# Patient Record
Sex: FEMALE | Race: WHITE | Employment: UNEMPLOYED | URBAN - METROPOLITAN AREA
[De-identification: names, ages, dates, MRNs, and addresses within clinical notes are randomized per-mention and may not be internally consistent; named-entity substitution may affect disease eponyms.]

---

## 2017-02-27 ENCOUNTER — COMMUNICATION - HEALTHEAST (OUTPATIENT)
Dept: SCHEDULING | Facility: CLINIC | Age: 42
End: 2017-02-27

## 2021-01-27 ENCOUNTER — TELEPHONE (OUTPATIENT)
Dept: PSYCHOLOGY | Facility: CLINIC | Age: 46
End: 2021-01-27

## 2021-01-28 ENCOUNTER — FCC EXTENDED DOCUMENTATION (OUTPATIENT)
Dept: PSYCHOLOGY | Facility: CLINIC | Age: 46
End: 2021-01-28

## 2021-01-28 ENCOUNTER — VIRTUAL VISIT (OUTPATIENT)
Dept: PSYCHOLOGY | Facility: CLINIC | Age: 46
End: 2021-01-28
Payer: COMMERCIAL

## 2021-01-28 DIAGNOSIS — F41.1 GAD (GENERALIZED ANXIETY DISORDER): ICD-10-CM

## 2021-01-28 DIAGNOSIS — F32.1 CURRENT MODERATE EPISODE OF MAJOR DEPRESSIVE DISORDER WITHOUT PRIOR EPISODE (H): Primary | ICD-10-CM

## 2021-01-28 PROCEDURE — 90834 PSYTX W PT 45 MINUTES: CPT | Mod: 95

## 2021-01-28 ASSESSMENT — ANXIETY QUESTIONNAIRES
4. TROUBLE RELAXING: SEVERAL DAYS
1. FEELING NERVOUS, ANXIOUS, OR ON EDGE: SEVERAL DAYS
2. NOT BEING ABLE TO STOP OR CONTROL WORRYING: NEARLY EVERY DAY
IF YOU CHECKED OFF ANY PROBLEMS ON THIS QUESTIONNAIRE, HOW DIFFICULT HAVE THESE PROBLEMS MADE IT FOR YOU TO DO YOUR WORK, TAKE CARE OF THINGS AT HOME, OR GET ALONG WITH OTHER PEOPLE: VERY DIFFICULT
3. WORRYING TOO MUCH ABOUT DIFFERENT THINGS: NEARLY EVERY DAY
5. BEING SO RESTLESS THAT IT IS HARD TO SIT STILL: SEVERAL DAYS
GAD7 TOTAL SCORE: 13
6. BECOMING EASILY ANNOYED OR IRRITABLE: MORE THAN HALF THE DAYS
7. FEELING AFRAID AS IF SOMETHING AWFUL MIGHT HAPPEN: MORE THAN HALF THE DAYS

## 2021-01-28 ASSESSMENT — COLUMBIA-SUICIDE SEVERITY RATING SCALE - C-SSRS
REASONS FOR IDEATION PAST MONTH: COMPLETELY TO END OR STOP THE PAIN (YOU COULDN'T GO ON LIVING WITH THE PAIN OR HOW YOU WERE FEELING)
3. HAVE YOU BEEN THINKING ABOUT HOW YOU MIGHT KILL YOURSELF?: NO
2. HAVE YOU ACTUALLY HAD ANY THOUGHTS OF KILLING YOURSELF?: YES
1. IN THE PAST MONTH, HAVE YOU WISHED YOU WERE DEAD OR WISHED YOU COULD GO TO SLEEP AND NOT WAKE UP?: YES
1. IN THE PAST MONTH, HAVE YOU WISHED YOU WERE DEAD OR WISHED YOU COULD GO TO SLEEP AND NOT WAKE UP?: YES
4. HAVE YOU HAD THESE THOUGHTS AND HAD SOME INTENTION OF ACTING ON THEM?: NO
REASONS FOR IDEATION LIFETIME: COMPLETELY TO END OR STOP THE PAIN (YOU COULDN'T GO ON LIVING WITH THE PAIN OR HOW YOU WERE FEELING)
1. IN THE PAST MONTH, HAVE YOU WISHED YOU WERE DEAD OR WISHED YOU COULD GO TO SLEEP AND NOT WAKE UP?: SAME AS ABOVE
5. HAVE YOU STARTED TO WORK OUT OR WORKED OUT THE DETAILS OF HOW TO KILL YOURSELF? DO YOU INTEND TO CARRY OUT THIS PLAN?: NO
ATTEMPT PAST THREE MONTHS: NO
2. HAVE YOU ACTUALLY HAD ANY THOUGHTS OF KILLING YOURSELF LIFETIME?: YES
5. HAVE YOU STARTED TO WORK OUT OR WORKED OUT THE DETAILS OF HOW TO KILL YOURSELF? DO YOU INTEND TO CARRY OUT THIS PLAN?: NO
ATTEMPT LIFETIME: NO
4. HAVE YOU HAD THESE THOUGHTS AND HAD SOME INTENTION OF ACTING ON THEM?: NO

## 2021-01-28 ASSESSMENT — PATIENT HEALTH QUESTIONNAIRE - PHQ9: SUM OF ALL RESPONSES TO PHQ QUESTIONS 1-9: 18

## 2021-01-28 NOTE — PROGRESS NOTES
"                                           Progress Note    Patient Name: Divya Kasper  Date: 2021         Service Type: Individual      Session Start Time: 10:00 AM  Session End Time: 10:45 AM     Session Length: 45 min    Session #: 1    Attendees: Client attended alone    Service Modality:  Phone Visit:      Provider verified identity through the following two step process.  Patient provided:  Patient  and Patient address    The patient has been notified of the following:      \"We have found that certain health care needs can be provided without the need for a face to face visit.  This service lets us provide the care you need with a phone conversation.       I will have full access to your [place] medical record during this entire phone call.   I will be taking notes for your medical record.      Since this is like an office visit, we will bill your insurance company for this service.       There are potential benefits and risks of telephone visits (e.g. limits to patient confidentiality) that differ from in-person visits.?  Confidentiality still applies for telephone services, and nobody will record the visit.  It is important to be in a quiet, private space that is free of distractions (including cell phone or other devices) during the visit.??      If during the course of the call I believe a telephone visit is not appropriate, you will not be charged for this service\"     Consent has been obtained for this service by care team member: Yes      Treatment Plan Last Reviewed: This was the patient's first session. Treatment plan will be completed after DA is complete.  PHQ-9 / RENETTA-7 : 2021    DATA  Interactive Complexity: No  Crisis: No       Progress Since Last Session (Related to Symptoms / Goals / Homework):   Symptoms: This was the patient's first session. Patient endorses symptoms of     Homework: This was the patient's first session.       Episode of Care Goals: This was the patient's " "first session. Patient reports overarching goal of      Current / Ongoing Stressors and Concerns:   Patient reports that she has been feeling like a \"bad mom\".      Treatment Objective(s) Addressed in This Session:   This was the patient's first session.        Intervention:   Motivational Interviewing: client-centered interview focusing on assessing the stages of change.         ASSESSMENT: Current Emotional / Mental Status (status of significant symptoms):   Risk status (Self / Other harm or suicidal ideation)   Patient denies current fears or concerns for personal safety.   Patient reports the following current or recent suicidal ideation or behaviors: patient reported passive SI. Created a safety plan during session. .   Patient denies current or recent homicidal ideation or behaviors.   Patient denies current or recent self injurious behavior or ideation.   Patient denies other safety concerns.     [place] Suicide Severity Rating Scale (Lifetime/Recent)  [place] Suicide Severity Rating (Lifetime/Recent) 1/28/2021   1. Wish to be Dead (Lifetime) Yes   Wish to be Dead Description (Lifetime) \"I just wish that I would go to sleep and not wake. I feel like my kids would be better off without me\"   1. Wish to be Dead (Recent) Yes   Wish to be Dead Description (Recent) same as above   2. Non-Specific Active Suicidal Thoughts (Lifetime) Yes   2. Non-Specific Active Suicidal Thoughts (Recent) Yes   Non-Specific Active Suicidal Thought Description (Recent) \"I wish I could go to bed and not wake\"   3. Active Suicidal Ideation with any Methods (Not Plan) Without Intent to Act (Lifetime) No   3. Active Suicidal Ideation with any Methods (Not Plan) Without Intent to Act (Recent) No   Active Suicidal Ideation with any Methods (Not Plan) Description (Recent) none   4. Active Suicidal Ideation with Some Intent to Act, Without Specific Plan (Lifetime) No   4. Active Suicidal Ideation with Some Intent to Act, Without Specific " Plan (Recent) No   Active Suicidal Ideation with Some Intent to Act, Without Specific Plan Description (Recent) none   5. Active Suicidal Ideation with Specific Plan and Intent (Lifetime) No   5. Active Suicidal Ideation with Specific Plan and Intent (Recent) No   Active Suicidal Ideation with Specific Plan and Intent Description (Recent) none   Most Severe Ideation Rating (Lifetime) 2   Most Severe Ideation Description (Lifetime) 2   Frequency (Lifetime) 1   Duration (Lifetime) 1   Controllability (Lifetime) 1   Protective Factors  (Lifetime) 1   Reasons for Ideation (Lifetime) 5   Most Severe Ideation Rating (Past Month) 1   Most Severe Ideation Description (Past Month) 1   Frequency (Past Month) 1   Duration (Past Month) 1   Controllability (Past Month) 1   Protective Factors (Past Month) 1   Reasons for Ideation (Past Month) 5   Actual Attempt (Lifetime) No   Actual Attempt (Past 3 Months) No   Has subject engaged in non-suicidal self-injurious behavior? (Lifetime) No   Has subject engaged in non-suicidal self-injurious behavior? (Past 3 Months) No        A safety and risk management plan has been developed including: Patient consented to co-developed safety plan.  Safety and risk management plan was completed.  Patient agreed to use safety plan should any safety concerns arise.  A copy was given to the patient.     Appearance:   Visit was conducted via phone call.    Eye Contact:   Visit was conducted via phone call.    Psychomotor Behavior: Visit was conducted via phone call.    Attitude:   Cooperative    Orientation:   All   Speech    Rate / Production: Normal     Volume:  Normal    Mood:    Anxious  Depressed    Affect:    Subdued    Thought Content:  Rumination  Cognitive Distortions   Thought Form:  Coherent    Insight:    Good      Medication Review:   Did not review with patient. Will review next session.      Medication Compliance:   Did not review with patient. Will review next session.      Changes in  "Health Issues:   Did not review with patient. Will review next session.      Chemical Use Review:   Substance Use: Chemical use reviewed, no active concerns identified      Tobacco Use: Did not review with patient. Will review next session.     Diagnosis:  1. Current moderate episode of major depressive disorder without prior episode (H)    2. RENETTA (generalized anxiety disorder)        Collateral Reports Completed:   Patient was asked to complete an KIAN for her PCP at [facility]    PLAN: (Patient Tasks / Therapist Tasks / Other)  Patient was asked to share her safety plan with her identified supports. She was also asked to complete and KIAN form for her PCP at [facility]. Patient was asked to call behavioral access staff to update her insurance information. We will discuss next session.         CAROLE Owen ABIEL 1/28/2021  Service Performed and Documented by BRADLEY-   Note reviewed and clinical supervision by CAROLE Vargas [facility] 2/1/2021                                                         ______________________________________________________________________                                             Divya L Haycraft     SAFETY PLAN:  Step 1: Warning signs / cues (Thoughts, images, mood, situation, behavior) that a crisis may be developing:    Thoughts: \"I don't matter\", \"I'm a burden\" and \"I can't do this anymore\"    Images: none    Thinking Processes: ruminations (can't stop thinking about my problems): over thinking and racing thoughts    Mood: worsening depression, hopelessness, helplessness, agitation and mood swings    Behaviors: can't stop crying, not taking care of myself and not taking care of my responsibilities    Situations: relationship problems and and the holidays   Step 2: Coping strategies - Things I can do to take my mind off of my problems without contacting another person (relaxation technique, physical activity):    Distress Tolerance Strategies:  play with my pet  and watch " "TV and play on her phone    Physical Activities: go for a walk    Focus on helpful thoughts:  unsure  Step 3: People and social settings that provide distraction:   Name: Matt Phone: number in personal cell phone   Name: Nella Phone: number in personal cell phone   Name: Ashlee (mom) Phone: number in personal cell phone  Step 4: Remind myself of people and things that are important to me and worth living for:  \"my kids, they're usually the only thing that reminds me how important I am.\"  Step 5: When I am in crisis, I can ask these people to help me use my safety plan:   Name: Matt    Name: Nella    Name: Ashlee   Step 6: Making the environment safe:     be around others  Step 7: Professionals or agencies I can contact during a crisis:    [facility] Daytime Number: [phone]    Suicide Prevention Lifeline: 7-204-693-TALK (8255)    Crisis Text Line Service (available 24 hours a day, 7 days a week): Text [place] to 203621    Call 911 or go to my nearest emergency department.   I helped develop this safety plan and agree to use it when needed.  I have been given a copy of this plan.      Client signature _________________________________________________________________  Today s date:  1/28/2021  Adapted from Safety Plan Template 2008 Tanya Barrientos and Pete Gurrola is reprinted with the express permission of the authors.  No portion of the Safety Plan Template may be reproduced without the express, written permission.  You can contact the authors at [email or web address] or [email or web address].            "
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
## 2021-01-29 ASSESSMENT — ANXIETY QUESTIONNAIRES: GAD7 TOTAL SCORE: 13

## 2021-02-16 ENCOUNTER — VIRTUAL VISIT (OUTPATIENT)
Dept: PSYCHOLOGY | Facility: CLINIC | Age: 46
End: 2021-02-16
Payer: COMMERCIAL

## 2021-02-16 DIAGNOSIS — F41.1 GAD (GENERALIZED ANXIETY DISORDER): ICD-10-CM

## 2021-02-16 DIAGNOSIS — F32.1 CURRENT MODERATE EPISODE OF MAJOR DEPRESSIVE DISORDER WITHOUT PRIOR EPISODE (H): Primary | ICD-10-CM

## 2021-02-16 PROCEDURE — 90834 PSYTX W PT 45 MINUTES: CPT | Mod: 95

## 2021-02-16 RX ORDER — VENLAFAXINE 75 MG/1
75 TABLET ORAL 3 TIMES DAILY
COMMUNITY

## 2021-02-16 RX ORDER — LORAZEPAM 0.5 MG/1
0.5 TABLET ORAL EVERY 8 HOURS PRN
COMMUNITY

## 2021-02-16 RX ORDER — METOPROLOL SUCCINATE 50 MG/1
50 TABLET, EXTENDED RELEASE ORAL DAILY
COMMUNITY

## 2021-02-16 NOTE — PROGRESS NOTES
Progress Note    Patient Name: Divya Kasper  Date: 2021         Service Type: Individual      Session Start Time: 1:30 PM  Session End Time: 2:15 PM     Session Length: 45 min    Session #: 2    Attendees: Client attended alone     Service Modality:  Video Visit:      Provider verified identity through the following two step process.  Patient provided:  Patient  and Patient address    Telemedicine Visit: The patient's condition can be safely assessed and treated via synchronous audio and visual telemedicine encounter.      Reason for Telemedicine Visit: Services only offered telehealth    Originating Site (Patient Location): Patient's home    Distant Site (Provider Location): Provider Remote Setting    Consent:  The patient/guardian has verbally consented to: the potential risks and benefits of telemedicine (video visit) versus in person care; bill my insurance or make self-payment for services provided; and responsibility for payment of non-covered services.     Patient would like the video invitation sent by:  Text to cell phone: [phone]    Mode of Communication:  Video Conference via [facility]    As the provider I attest to compliance with applicable laws and regulations related to telemedicine.     Treatment Plan Last Reviewed: This was the patient's first session. Treatment plan will be completed after DA is complete.  PHQ-9 / RENETTA-7 : 2021    DATA  Interactive Complexity: No  Crisis: No       Progress Since Last Session (Related to Symptoms / Goals / Homework):   Symptoms: No change based on patient self-report. Patient endorses symptoms of little interest in doing things, feeling depressed, sleep disturbance, feeling tired, over eating, low self-worth, passive SI, feeling anxious, being unable to control her worry, worrying too much about different things, difficulty relaxing, restlessness, irritability, and feeling afraid something awful might happen.  "    Homework: Achieved / completed to satisfaction      Episode of Care Goals: This was the patient's second session. Provider is still working on completing the DA and has not set treatment plan goals yet.      Current / Ongoing Stressors and Concerns:   Patient reports that she has been feeling like a \"bad mom\". Patient reports that she is financially stressed. She states that she is stress about her job and helping her daughter with school work.      Treatment Objective(s) Addressed in This Session:   This was the patient's second session. No treatment plan goals have been set yet.        Intervention:   Motivational Interviewing: client-centered interview focusing on assessing the stages of change.         ASSESSMENT: Current Emotional / Mental Status (status of significant symptoms):   Risk status (Self / Other harm or suicidal ideation)   Patient denies current fears or concerns for personal safety.   Patient reports the following current or recent suicidal ideation or behaviors: patient reported passive SI. Patient reports that she can keep herself safe at home. .   Patient denies current or recent homicidal ideation or behaviors.   Patient denies current or recent self injurious behavior or ideation.   Patient denies other safety concerns.              Patient reports there has been no change in risk factors since their last session.     Patient reports there has been no change in protective factors since their last session.     A safety and risk management plan has been developed including: Patient consented to co-developed safety plan.  Safety and risk management plan was completed.  Patient agreed to use safety plan should any safety concerns arise.  A copy was given to the patient.                                               Appearance:   Appropriate    Eye Contact:   Good    Psychomotor Behavior: Normal    Attitude:   Cooperative    Orientation:   All   Speech    Rate / Production: Normal " "    Volume:  Normal    Mood:    Anxious  Depressed    Affect:    Subdued    Thought Content:  Rumination  Cognitive Distortions   Thought Form:  Coherent    Insight:    Good      Medication Review:   Updated medication list.      Medication Compliance:   Yes.      Changes in Health Issues:   None reported.      Chemical Use Review:   Substance Use: Chemical use reviewed, no active concerns identified      Tobacco Use: Did not review with patient. Will review next session.     Diagnosis:  1. Current moderate episode of major depressive disorder without prior episode (H)    2. RENETTA (generalized anxiety disorder)        Collateral Reports Completed:   Patient was asked to complete an KIAN for her PCP at [facility]    PLAN: (Patient Tasks / Therapist Tasks / Other)  Patient was asked to complete and KIAN form for her PCP at [facility]. Patient was asked to think of attainable therapeutic goals related to overarching goals identified in session. We will discuss next session.         CAROLE Owen [facility] 2/16/2021  Service Performed and Documented by ABIEL-   Note reviewed and clinical supervision by CAROLE Vargas [facility] 2/17/2021                                                         ______________________________________________________________________                                             Divya L Haycraft     SAFETY PLAN:  Step 1: Warning signs / cues (Thoughts, images, mood, situation, behavior) that a crisis may be developing:    Thoughts: \"I don't matter\", \"I'm a burden\" and \"I can't do this anymore\"    Images: none    Thinking Processes: ruminations (can't stop thinking about my problems): over thinking and racing thoughts    Mood: worsening depression, hopelessness, helplessness, agitation and mood swings    Behaviors: can't stop crying, not taking care of myself and not taking care of my responsibilities    Situations: relationship problems and and the holidays   Step 2: Coping strategies - " "Things I can do to take my mind off of my problems without contacting another person (relaxation technique, physical activity):    Distress Tolerance Strategies:  play with my pet  and watch TV and play on her phone    Physical Activities: go for a walk    Focus on helpful thoughts:  unsure  Step 3: People and social settings that provide distraction:   Name: Matt Phone: number in personal cell phone   Name: Nella Phone: number in personal cell phone   Name: Ashlee (mom) Phone: number in personal cell phone  Step 4: Remind myself of people and things that are important to me and worth living for:  \"my kids, they're usually the only thing that reminds me how important I am.\"  Step 5: When I am in crisis, I can ask these people to help me use my safety plan:   Name: Matt    Name: Nella    Name: Ashlee   Step 6: Making the environment safe:     be around others  Step 7: Professionals or agencies I can contact during a crisis:    [facility] Daytime Number: [phone]    Suicide Prevention Lifeline: 1-200-933-MOOQ (4753)    Crisis Text Line Service (available 24 hours a day, 7 days a week): Text [place] to 099732    Call 911 or go to my nearest emergency department.   I helped develop this safety plan and agree to use it when needed.  I have been given a copy of this plan.      Client signature _________________________________________________________________  Today s date:  1/28/2021  Adapted from Safety Plan Template 2008 Tanya Barrientos and Pete Gurrola is reprinted with the express permission of the authors.  No portion of the Safety Plan Template may be reproduced without the express, written permission.  You can contact the authors at [email or web address] or [email or web address].          "

## 2021-02-23 ENCOUNTER — VIRTUAL VISIT (OUTPATIENT)
Dept: PSYCHOLOGY | Facility: CLINIC | Age: 46
End: 2021-02-23
Payer: COMMERCIAL

## 2021-02-23 DIAGNOSIS — F34.1 PERSISTENT DEPRESSIVE DISORDER: Primary | ICD-10-CM

## 2021-02-23 DIAGNOSIS — F41.1 GAD (GENERALIZED ANXIETY DISORDER): ICD-10-CM

## 2021-02-23 PROCEDURE — 90791 PSYCH DIAGNOSTIC EVALUATION: CPT | Mod: 95
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
## 2021-02-24 ENCOUNTER — FCC EXTENDED DOCUMENTATION (OUTPATIENT)
Dept: PSYCHOLOGY | Facility: CLINIC | Age: 46
End: 2021-02-24

## 2021-02-24 NOTE — PROGRESS NOTES
Patient reported passive SI during DA session. Provider reviewed safety plan and identified supports with patient. Provider reminded patient that she is always able to go to the Emergency Department if she feels that she cannot keep herself safe. Patient contracted for safety.

## 2021-02-26 ENCOUNTER — TELEPHONE (OUTPATIENT)
Dept: BEHAVIORAL HEALTH | Facility: CLINIC | Age: 46
End: 2021-02-26

## 2021-02-26 NOTE — TELEPHONE ENCOUNTER
"Pt was referred to Adult MH programming by her [facility] therapist, Danyell Bowling. Provider's referral states \"[place] [facility] Assessment has been completed by referring provider.\"     Leanna crowley in Referral #[id number]    Sent message to program    "

## 2021-03-02 ENCOUNTER — VIRTUAL VISIT (OUTPATIENT)
Dept: PSYCHOLOGY | Facility: CLINIC | Age: 46
End: 2021-03-02
Payer: COMMERCIAL

## 2021-03-02 DIAGNOSIS — F41.1 GAD (GENERALIZED ANXIETY DISORDER): Primary | ICD-10-CM

## 2021-03-02 DIAGNOSIS — F34.1 PERSISTENT DEPRESSIVE DISORDER: ICD-10-CM

## 2021-03-02 PROCEDURE — 90834 PSYTX W PT 45 MINUTES: CPT | Mod: 95

## 2021-03-02 ASSESSMENT — ANXIETY QUESTIONNAIRES
GAD7 TOTAL SCORE: 17
4. TROUBLE RELAXING: SEVERAL DAYS
IF YOU CHECKED OFF ANY PROBLEMS ON THIS QUESTIONNAIRE, HOW DIFFICULT HAVE THESE PROBLEMS MADE IT FOR YOU TO DO YOUR WORK, TAKE CARE OF THINGS AT HOME, OR GET ALONG WITH OTHER PEOPLE: VERY DIFFICULT
3. WORRYING TOO MUCH ABOUT DIFFERENT THINGS: NEARLY EVERY DAY
7. FEELING AFRAID AS IF SOMETHING AWFUL MIGHT HAPPEN: NEARLY EVERY DAY
5. BEING SO RESTLESS THAT IT IS HARD TO SIT STILL: SEVERAL DAYS
2. NOT BEING ABLE TO STOP OR CONTROL WORRYING: NEARLY EVERY DAY
6. BECOMING EASILY ANNOYED OR IRRITABLE: NEARLY EVERY DAY
1. FEELING NERVOUS, ANXIOUS, OR ON EDGE: NEARLY EVERY DAY

## 2021-03-02 ASSESSMENT — PATIENT HEALTH QUESTIONNAIRE - PHQ9: SUM OF ALL RESPONSES TO PHQ QUESTIONS 1-9: 20

## 2021-03-02 NOTE — PROGRESS NOTES
Progress Note    Patient Name: Divya Kasper  Date: 3/2/2021         Service Type: Individual      Session Start Time: 1:30 PM  Session End Time: 2:15 PM     Session Length: 45 min    Session #: 4    Attendees: Client attended alone    Service Modality:  Video Visit:      Provider verified identity through the following two step process.  Patient provided:  Patient is known previously to provider    Telemedicine Visit: The patient's condition can be safely assessed and treated via synchronous audio and visual telemedicine encounter.      Reason for Telemedicine Visit: Services only offered telehealth    Originating Site (Patient Location): Patient's home    Distant Site (Provider Location): Provider Remote Setting    Consent:  The patient/guardian has verbally consented to: the potential risks and benefits of telemedicine (video visit) versus in person care; bill my insurance or make self-payment for services provided; and responsibility for payment of non-covered services.     Patient would like the video invitation sent by:  Text to cell phone: [phone]    Mode of Communication:  Video Conference via Amwell    As the provider I attest to compliance with applicable laws and regulations related to telemedicine.     Treatment Plan Last Reviewed: 3/2/2021  PHQ-9 / RENETTA-7 : 3/2/2021    DATA  Interactive Complexity: No  Crisis: No       Progress Since Last Session (Related to Symptoms / Goals / Homework):   Symptoms: Worsening based on PHQ-9 and RENETTA-7 scores and patient self-report. Patient endorses symptoms of little interest in doing things, feeling depressed, sleeping too much, feeling tired, poor appetite, low self-worth, difficulty concentrating, feeling anxious, difficulty controlling worry, worrying too much about different things, difficulty relaxing, restlessness, irritability, and feeling afraid something awful might happen.     Homework: Achieved / completed to  "satisfaction      Episode of Care Goals: Satisfactory progress - CONTEMPLATION (Considering change and yet undecided); Intervened by assessing the negative and positive thinking (ambivalence) about behavior change. Patient and provider collaborated to create a treatment plan this session.      Current / Ongoing Stressors and Concerns:   Patient reports that she has been feeling like a \"bad mom\". Patient reports that she is financially stressed. She states that she is stress about helping her daughter with school work. Patient reports that she is concerned about her relationship with her daughter's father.      Treatment Objective(s) Addressed in This Session:   created a treatment plan this session.      Intervention:   Motivational Interviewing: client-centered interview focusing on assessing the stages of change. Discussed IOP and PHP options for programming.         ASSESSMENT: Current Emotional / Mental Status (status of significant symptoms):   Risk status (Self / Other harm or suicidal ideation)   Patient denies current fears or concerns for personal safety.   Patient denies current or recent suicidal ideation or behaviors.   Patient denies current or recent homicidal ideation or behaviors.   Patient denies current or recent self injurious behavior or ideation.   Patient denies other safety concerns.   Patient reports there has been no change in risk factors since their last session.     Patient reports there has been no change in protective factors since their last session.     A safety and risk management plan has been developed including: Patient consented to co-developed safety plan.  Safety and risk management plan was completed.  Patient agreed to use safety plan should any safety concerns arise.  A copy was given to the patient.     Appearance:   Appropriate    Eye Contact:   Good    Psychomotor Behavior: Normal    Attitude:   Cooperative    Orientation:   All   Speech    Rate / Production: Normal " "    Volume:  Normal    Mood:    Anxious  Depressed    Affect:    Subdued    Thought Content:  Rumination  Cognitive Distortions   Thought Form:  Coherent    Insight:    Good      Medication Review:   No changes to current psychiatric medication(s)     Medication Compliance:   Yes     Changes in Health Issues:   None reported     Chemical Use Review:   Substance Use: Chemical use reviewed, no active concerns identified      Tobacco Use: No current tobacco use.      Diagnosis:  1. RENETTA (generalized anxiety disorder)    2. Persistent depressive disorder        Collateral Reports Completed:   Not Applicable    PLAN: (Patient Tasks / Therapist Tasks / Other)  Patient was asked to create a list of coping skills that she is currently using. Patient was asked to call behavioral access to schedule an intake for IOP/PHP. We will discuss next session.     CAROLE Owen [facility] 3/2/2021  Service Performed and Documented by ABIEL-   Note reviewed and clinical supervision by CAROLE Vargas [facility] 3/3/2021                                                       ______________________________________________________________________    Treatment Plan    Patient's Name: Divya Kasper  YOB: 1975    Date: 3/2/2021    DSM5 Diagnoses: 300.4 (F34.1) Persistent Depressive Disorder, Severe or 300.02 (F41.1) Generalized Anxiety Disorder  Psychosocial / Contextual Factors: Spiritual Beliefs: \"I still go to [employer] once in a while but I'm not strict about it. I try to get there especially on holidays\". Individual Factors patient's ex lives across the street from her and she sees him daily, Societal Factors COVID-19 pandemic and Economic Factors patient is currently unemployed.   WHODAS: 19    Referral / Collaboration:  The following referral(s) will be initiated: PHP or IOP.    Anticipated number of session or this episode of care: 12      MeasurableTreatment Goal(s) related to diagnosis / functional impairment(s)  Goal 1: " Client will work on stabilizing depressive symptoms as evidenced by PHQ scores (current is 20) and Self report.  Goal is to reduce by five points.      I will know I've met my goal when I can get up out of bed and be up for more than a half hour at a time.      Objective #A    Client will bring to session stressors since last visit to process and formulate coping mechanisms.  Status: New - Date: 3/2/2021    Intervention(s)  Therapist will bring to each session thought patterns that contribute to stress and depression, and develop cognitive restructuring techniques to help manage these thought distortions.     Objective #B  Client will incorporate self-care in everyday life to manage physical and mental health.   Status: New - Date: 3/2/2021    Intervention(s)  Therapist will process through with success and failures related to self-care activities, including breathing exercises, watching movies, spending time with her daughter.     Objective #C  Client will reduce feeling bad about herself by being more aware of cognitive distortions.  Status: New - Date: 3/2/2021    Intervention(s)  Therapist will teach about cognitive distortions, specifically patterns, and look at ways to be more aware of thoughts.        Goal 2: Client will work on stabilizing anxiety symptoms as evidenced by RENETTA-7 scores (current is 17) and Self report.  Goal is to reduce by five points.      I will know I've met my goal when my heart isn't racing and feeling like it's going to beat out of my chest.      Objective #A Client will identify triggers of anxiety and process them in session.    Status: New - Date: 3/2/2021    Intervention(s)  Therapist will teach emotional recognition/identification by assigning homework to journal with written exercises.    Objective #B  Client will identify initial signs or symptoms of anxiety.    Status: New - Date: 3/2/2021    Intervention(s)  Therapist will teach emotional regulation skills, such as deep  breathing and mindfulness skills.    Objective #C  Client will use thought-stopping strategy daily to reduce intrusive thoughts.  Status: New - Date: 3/2/2021    Intervention(s)  Therapist will provide educational materials on distress tolerance skills and other copings skills.      Goal 3: Client will be monitored for safety concerns (sucidal ideation).     Objective #A  Client will be assessed at each session for suicidal ideation.  Status: New - Date: 3/2/2021     Intervention(s)  Therapist will at each session review SI thoughts, safety plan as needed, and provide appropirate support        Patient has reviewed and agreed to the above plan.      CAROLE Owen  March 2, 2021  Service Performed and Documented by ABIEL-   tx plan reviewed and clinical supervision by CAROLE Vargas [facility] 3/3/2021

## 2021-03-03 ASSESSMENT — ANXIETY QUESTIONNAIRES: GAD7 TOTAL SCORE: 17

## 2021-03-05 ENCOUNTER — TELEPHONE (OUTPATIENT)
Dept: BEHAVIORAL HEALTH | Facility: CLINIC | Age: 46
End: 2021-03-05

## 2021-03-05 NOTE — TELEPHONE ENCOUNTER
This clinician called the patient regarding the referral for Outpatient programs.  Explained that since she completed a DA on 2/23 we can complete an update via phone and recommend a level of care.  The patient stated she is not feeling well today and requested a callback any time on Monday.     CAROLE Leach, [facility]  Mental [facility]  Phone: [phone]

## 2021-03-05 NOTE — TELEPHONE ENCOUNTER
Patient called intake, has not heard back from anyone yet, resent benefits to be checked for march and will send email to Swetha to review, patient is aware she will get call back with directive

## 2021-03-07 ENCOUNTER — HEALTH MAINTENANCE LETTER (OUTPATIENT)
Age: 46
End: 2021-03-07

## 2021-03-08 ENCOUNTER — TELEPHONE (OUTPATIENT)
Dept: BEHAVIORAL HEALTH | Facility: CLINIC | Age: 46
End: 2021-03-08

## 2021-03-08 ASSESSMENT — ANXIETY QUESTIONNAIRES
3. WORRYING TOO MUCH ABOUT DIFFERENT THINGS: NEARLY EVERY DAY
GAD7 TOTAL SCORE: 15
7. FEELING AFRAID AS IF SOMETHING AWFUL MIGHT HAPPEN: NEARLY EVERY DAY
5. BEING SO RESTLESS THAT IT IS HARD TO SIT STILL: NOT AT ALL
6. BECOMING EASILY ANNOYED OR IRRITABLE: NEARLY EVERY DAY
IF YOU CHECKED OFF ANY PROBLEMS ON THIS QUESTIONNAIRE, HOW DIFFICULT HAVE THESE PROBLEMS MADE IT FOR YOU TO DO YOUR WORK, TAKE CARE OF THINGS AT HOME, OR GET ALONG WITH OTHER PEOPLE: VERY DIFFICULT
2. NOT BEING ABLE TO STOP OR CONTROL WORRYING: NEARLY EVERY DAY
1. FEELING NERVOUS, ANXIOUS, OR ON EDGE: NEARLY EVERY DAY

## 2021-03-08 ASSESSMENT — PATIENT HEALTH QUESTIONNAIRE - PHQ9
5. POOR APPETITE OR OVEREATING: NOT AT ALL
SUM OF ALL RESPONSES TO PHQ QUESTIONS 1-9: 23

## 2021-03-08 NOTE — TELEPHONE ENCOUNTER
Writer called pt today to discuss ADT openings and offer a tentative start date.  Left a vm message requesting a return phone call.

## 2021-03-08 NOTE — TELEPHONE ENCOUNTER
spoke with patient on this date.  Reviewed any changes regarding patients status since Diagnostic Assessment on 2/23/21.  PHQ9 and RENETTA 7 updated.  Patient denies current thoughts of self harm, suicidal ideation.  Patient reported that she started individual therapy after initial evaluation and feels that individual therapy is not enough to manage what she is experiencing.   reviewed medications. Patient placed on waitlist for ADT.

## 2021-03-08 NOTE — TELEPHONE ENCOUNTER
LOCUS Worksheet     Name: Divya Kasper MRN: [id number]    : 1975      Gender:  female    PMI:  [id number]   Provider NPI: [id number] Provider Name: REINALDO Torres    Actual level of Care Provided:  Therapy, medications    Service(s) receiving or referred to:  Adult Day Treatment    Reason for Variance: Higher Level of Care      Rating completed by: Kandace Haque [facility]      I. Risk of Harm:   2      Low Risk of Harm    II. Functional Status:   3      Moderate Impairment    III. Co-Morbidity:   1      No Co-Morbidity    IV - A. Recovery Environment - Level of Stress:   2      Mildly Stressful Environment    IV - B. Recovery Environment - Level of Support:   3      Limited Support in Environment    V. Treatment and Recovery History:   3      Moderate to Equivocal Response to Treatment and Recovery Management    VI. Engagement and Recovery Project:   3      Limited Engagement and Recovery       17 Composite Score    Level of Care Recommendation:   17 to 19       High Intensity Community Based Services

## 2021-03-09 ENCOUNTER — TELEPHONE (OUTPATIENT)
Dept: PSYCHOLOGY | Facility: CLINIC | Age: 46
End: 2021-03-09

## 2021-03-09 ASSESSMENT — ANXIETY QUESTIONNAIRES: GAD7 TOTAL SCORE: 15

## 2021-03-09 NOTE — TELEPHONE ENCOUNTER
Provider called patient to see if she was still available for her 3:30 PM session. Provider gave patient the phone number for behavioral access team in case she wanted to reschedule. Provider reminded patient of her next scheduled session on 3/18.

## 2021-03-12 ENCOUNTER — TELEPHONE (OUTPATIENT)
Dept: BEHAVIORAL HEALTH | Facility: CLINIC | Age: 46
End: 2021-03-12

## 2021-03-12 NOTE — TELEPHONE ENCOUNTER
Writer called Pt today and left a message requesting a call back regarding interest and a start date in the program.

## 2021-03-17 ENCOUNTER — TELEPHONE (OUTPATIENT)
Dept: BEHAVIORAL HEALTH | Facility: CLINIC | Age: 46
End: 2021-03-17

## 2021-03-17 NOTE — TELEPHONE ENCOUNTER
Writer called Pt again and left a message to coordinate a start date for the ADT program.  Pt has not answered, nor returned previous phone calls in the past week.  Writer left message requesting a return call.

## 2021-03-18 ENCOUNTER — VIRTUAL VISIT (OUTPATIENT)
Dept: PSYCHOLOGY | Facility: CLINIC | Age: 46
End: 2021-03-18
Payer: COMMERCIAL

## 2021-03-18 DIAGNOSIS — F41.1 GAD (GENERALIZED ANXIETY DISORDER): Primary | ICD-10-CM

## 2021-03-18 DIAGNOSIS — F34.1 PERSISTENT DEPRESSIVE DISORDER: ICD-10-CM

## 2021-03-18 PROCEDURE — 90834 PSYTX W PT 45 MINUTES: CPT | Mod: 95

## 2021-03-18 ASSESSMENT — ANXIETY QUESTIONNAIRES
2. NOT BEING ABLE TO STOP OR CONTROL WORRYING: NEARLY EVERY DAY
3. WORRYING TOO MUCH ABOUT DIFFERENT THINGS: NEARLY EVERY DAY
5. BEING SO RESTLESS THAT IT IS HARD TO SIT STILL: NOT AT ALL
IF YOU CHECKED OFF ANY PROBLEMS ON THIS QUESTIONNAIRE, HOW DIFFICULT HAVE THESE PROBLEMS MADE IT FOR YOU TO DO YOUR WORK, TAKE CARE OF THINGS AT HOME, OR GET ALONG WITH OTHER PEOPLE: VERY DIFFICULT
6. BECOMING EASILY ANNOYED OR IRRITABLE: NEARLY EVERY DAY
7. FEELING AFRAID AS IF SOMETHING AWFUL MIGHT HAPPEN: NEARLY EVERY DAY
1. FEELING NERVOUS, ANXIOUS, OR ON EDGE: NEARLY EVERY DAY
GAD7 TOTAL SCORE: 15
4. TROUBLE RELAXING: NOT AT ALL

## 2021-03-18 ASSESSMENT — PATIENT HEALTH QUESTIONNAIRE - PHQ9: SUM OF ALL RESPONSES TO PHQ QUESTIONS 1-9: 22

## 2021-03-18 NOTE — PROGRESS NOTES
Progress Note    Patient Name: Divya Kasper  Date: 3/18/2021         Service Type: Individual      Session Start Time: 2:30 PM  Session End Time: 3:15 PM     Session Length: 45 min    Session #: 5    Attendees: Client attended alone    Service Modality:  Video Visit:      Provider verified identity through the following two step process.  Patient provided:  Patient is known previously to provider    Telemedicine Visit: The patient's condition can be safely assessed and treated via synchronous audio and visual telemedicine encounter.      Reason for Telemedicine Visit: Services only offered telehealth    Originating Site (Patient Location): Patient's home    Distant Site (Provider Location): Provider Remote Setting    Consent:  The patient/guardian has verbally consented to: the potential risks and benefits of telemedicine (video visit) versus in person care; bill my insurance or make self-payment for services provided; and responsibility for payment of non-covered services.     Patient would like the video invitation sent by:  Text to cell phone: [phone]    Mode of Communication:  Video Conference via Amwell    As the provider I attest to compliance with applicable laws and regulations related to telemedicine.     Treatment Plan Last Reviewed: 3/2/2021  PHQ-9 / RENETTA-7 : 3/18/2021    DATA  Interactive Complexity: No  Crisis: No       Progress Since Last Session (Related to Symptoms / Goals / Homework):   Symptoms: No change based on PHQ-9 and RENETTA-7 scores and patient self-report. Patient endorses symptoms of little interest in doing things, feeling depressed, sleeping too much, feeling tired, poor appetite, low self-worth, difficulty concentrating, psychomotor slowing, feeling anxious, difficulty controlling worry, worrying too much about different things, irritability, and feeling afraid something awful might happen.     Homework: Achieved / completed to  "satisfaction      Episode of Care Goals: Satisfactory progress - CONTEMPLATION (Considering change and yet undecided); Intervened by assessing the negative and positive thinking (ambivalence) about behavior change.       Current / Ongoing Stressors and Concerns:   Patient reports that she has been feeling like a \"bad mom\". Patient reports that she is financially stressed. She states that she is stress about helping her daughter with school work. Patient reports that she is concerned about her relationship with her daughter's father. Patient reports that she is stressed about the custody frausto for her daughter.     Treatment Objective(s) Addressed in This Session:   Decrease frequency and intensity of feeling down, depressed, hopeless     Intervention:   Built rapport with patient. Validated patient's feeling of anxiety and anger concerning the custody frausto over her daughter. Supported patient in thinking of supports that can help when she is feeling anxious or depressed. Continued to discuss IOP and PHP options for programming. Discussed attendance policy with patient and she agreed.         ASSESSMENT: Current Emotional / Mental Status (status of significant symptoms):   Risk status (Self / Other harm or suicidal ideation)   Patient denies current fears or concerns for personal safety.   Patient denies current or recent suicidal ideation or behaviors.   Patient denies current or recent homicidal ideation or behaviors.   Patient denies current or recent self injurious behavior or ideation.   Patient denies other safety concerns.   Patient reports there has been no change in risk factors since their last session.     Patient reports there has been no change in protective factors since their last session.     A safety and risk management plan has been developed including: Patient consented to co-developed safety plan.  Safety and risk management plan was completed.  Patient agreed to use safety plan should any " "safety concerns arise.  A copy was given to the patient.     Appearance:   Appropriate    Eye Contact:   Good    Psychomotor Behavior: Normal    Attitude:   Cooperative    Orientation:   All   Speech    Rate / Production: Normal     Volume:  Normal    Mood:    Anxious  Depressed    Affect:    Subdued    Thought Content:  Rumination  Cognitive Distortions   Thought Form:  Coherent    Insight:    Good      Medication Review:   No changes to current psychiatric medication(s)     Medication Compliance:   Yes     Changes in Health Issues:   None reported     Chemical Use Review:   Substance Use: Chemical use reviewed, no active concerns identified      Tobacco Use: No current tobacco use.      Diagnosis:  1. RENETTA (generalized anxiety disorder)    2. Persistent depressive disorder        Collateral Reports Completed:   made referral to psychiatry    PLAN: (Patient Tasks / Therapist Tasks / Other)  Patient was asked to create a list of coping skills that she is currently using. Patient was asked to call behavioral access to schedule an intake for IOP/PHP. We will discuss next session.       CAROLE Owen [facility] 3/18/2021  Service Performed and Documented by ABIEL-   Note reviewed and clinical supervision by CAROLE Vargas [facility] 3/23/2021    ______________________________________________________________________    Treatment Plan    Patient's Name: Divya Kasper  YOB: 1975    Date: 3/2/2021    DSM5 Diagnoses: 300.4 (F34.1) Persistent Depressive Disorder, Severe or 300.02 (F41.1) Generalized Anxiety Disorder  Psychosocial / Contextual Factors: Spiritual Beliefs: \"I still go to [employer] once in a while but I'm not strict about it. I try to get there especially on holidays\". Individual Factors patient's ex lives across the street from her and she sees him daily, Societal Factors COVID-19 pandemic and Economic Factors patient is currently unemployed.   WHODAS: 19    Referral / Collaboration:  The " following referral(s) will be initiated: PHP or IOP.    Anticipated number of session or this episode of care: 12      MeasurableTreatment Goal(s) related to diagnosis / functional impairment(s)  Goal 1: Client will work on stabilizing depressive symptoms as evidenced by PHQ scores (current is 20) and Self report.  Goal is to reduce by five points.      I will know I've met my goal when I can get up out of bed and be up for more than a half hour at a time.      Objective #A    Client will bring to session stressors since last visit to process and formulate coping mechanisms.  Status: New - Date: 3/2/2021    Intervention(s)  Therapist will bring to each session thought patterns that contribute to stress and depression, and develop cognitive restructuring techniques to help manage these thought distortions.     Objective #B  Client will incorporate self-care in everyday life to manage physical and mental health.   Status: New - Date: 3/2/2021    Intervention(s)  Therapist will process through with success and failures related to self-care activities, including breathing exercises, watching movies, spending time with her daughter.     Objective #C  Client will reduce feeling bad about herself by being more aware of cognitive distortions.  Status: New - Date: 3/2/2021    Intervention(s)  Therapist will teach about cognitive distortions, specifically patterns, and look at ways to be more aware of thoughts.        Goal 2: Client will work on stabilizing anxiety symptoms as evidenced by RENETTA-7 scores (current is 17) and Self report.  Goal is to reduce by five points.      I will know I've met my goal when my heart isn't racing and feeling like it's going to beat out of my chest.      Objective #A Client will identify triggers of anxiety and process them in session.    Status: New - Date: 3/2/2021    Intervention(s)  Therapist will teach emotional recognition/identification by assigning homework to journal with written  exercises.    Objective #B  Client will identify initial signs or symptoms of anxiety.    Status: New - Date: 3/2/2021    Intervention(s)  Therapist will teach emotional regulation skills, such as deep breathing and mindfulness skills.    Objective #C  Client will use thought-stopping strategy daily to reduce intrusive thoughts.  Status: New - Date: 3/2/2021    Intervention(s)  Therapist will provide educational materials on distress tolerance skills and other copings skills.      Goal 3: Client will be monitored for safety concerns (sucidal ideation).     Objective #A  Client will be assessed at each session for suicidal ideation.  Status: New - Date: 3/2/2021     Intervention(s)  Therapist will at each session review SI thoughts, safety plan as needed, and provide appropirate support        Patient has reviewed and agreed to the above plan.      CAROLE Owen  March 2, 2021  Service Performed and Documented by RAVEN villasenor plan reviewed and clinical supervision by CAROLE Vargas [facility] 3/3/2021

## 2021-03-19 ASSESSMENT — ANXIETY QUESTIONNAIRES: GAD7 TOTAL SCORE: 15

## 2021-03-22 ENCOUNTER — VIRTUAL VISIT (OUTPATIENT)
Dept: PSYCHOLOGY | Facility: CLINIC | Age: 46
End: 2021-03-22
Payer: COMMERCIAL

## 2021-03-22 DIAGNOSIS — F34.1 PERSISTENT DEPRESSIVE DISORDER: Primary | ICD-10-CM

## 2021-03-22 DIAGNOSIS — F41.1 GAD (GENERALIZED ANXIETY DISORDER): ICD-10-CM

## 2021-03-22 PROCEDURE — 90834 PSYTX W PT 45 MINUTES: CPT | Mod: 95

## 2021-03-22 NOTE — PROGRESS NOTES
Progress Note    Patient Name: Divya Kasper  Date: 3/22/2021         Service Type: Individual      Session Start Time: 1:30 PM  Session End Time: 2:15 PM     Session Length: 45 min    Session #: 6    Attendees: Client attended alone    Service Modality:  Video Visit:      Provider verified identity through the following two step process.  Patient provided:  Patient is known previously to provider    Telemedicine Visit: The patient's condition can be safely assessed and treated via synchronous audio and visual telemedicine encounter.      Reason for Telemedicine Visit: Services only offered telehealth    Originating Site (Patient Location): Patient's home    Distant Site (Provider Location): Provider Remote Setting    Consent:  The patient/guardian has verbally consented to: the potential risks and benefits of telemedicine (video visit) versus in person care; bill my insurance or make self-payment for services provided; and responsibility for payment of non-covered services.     Patient would like the video invitation sent by:  Text to cell phone: [phone]    Mode of Communication:  Video Conference via [employer]    As the provider I attest to compliance with applicable laws and regulations related to telemedicine.     Treatment Plan Last Reviewed: 3/2/2021  PHQ-9 / RENETTA-7 : 3/18/2021    DATA  Interactive Complexity: No  Crisis: No       Progress Since Last Session (Related to Symptoms / Goals / Homework):   Symptoms: No change based on PHQ-9 and RENETTA-7 scores and patient self-report. Patient endorses symptoms of little interest in doing things, feeling depressed, sleeping too much, feeling tired, poor appetite, low self-worth, difficulty concentrating, psychomotor slowing, feeling anxious, difficulty controlling worry, worrying too much about different things, irritability, and feeling afraid something awful might happen.     Homework: Achieved / completed to  "satisfaction      Episode of Care Goals: Satisfactory progress - CONTEMPLATION (Considering change and yet undecided); Intervened by assessing the negative and positive thinking (ambivalence) about behavior change.       Current / Ongoing Stressors and Concerns:   Patient reports that she has been feeling like a \"bad mom\". Patient reports that she is financially stressed. She states that she is stress about helping her daughter with school work. Patient reports that she is concerned about her relationship with her daughter's father. Patient reports that she is stressed about the custody frausto for her daughter.     Treatment Objective(s) Addressed in This Session:   use thought-stopping strategy daily to reduce intrusive thoughts     Intervention:   Built rapport with patient. Validated patient's feeling of anxiety. Discussed the importance of recognizing warning signs of increasing anxiety so that interventions can be used before anxiety increases. Supported patient in thinking of ways that she can reduce anxiety (go outside, deep breathing). Taught patient the 5-4-3-2-1 grounding exercise. Provided resources for rental assistance programs per patient's request ([place] [address] Emergency Rental Assistance and [facility] Emergency Assistance).      ASSESSMENT: Current Emotional / Mental Status (status of significant symptoms):   Risk status (Self / Other harm or suicidal ideation)   Patient denies current fears or concerns for personal safety.   Patient denies current or recent suicidal ideation or behaviors.   Patient denies current or recent homicidal ideation or behaviors.   Patient denies current or recent self injurious behavior or ideation.   Patient denies other safety concerns.   Patient reports there has been no change in risk factors since their last session.     Patient reports there has been no change in protective factors since their last session.     A safety and risk management plan has been " "developed including: Patient consented to co-developed safety plan.  Safety and risk management plan was completed.  Patient agreed to use safety plan should any safety concerns arise.  A copy was given to the patient.     Appearance:   Appropriate    Eye Contact:   Good    Psychomotor Behavior: Normal    Attitude:   Cooperative    Orientation:   All   Speech    Rate / Production: Normal     Volume:  Normal    Mood:    Anxious  Depressed    Affect:    Subdued    Thought Content:  Rumination  Cognitive Distortions   Thought Form:  Coherent    Insight:    Good      Medication Review:   No changes to current psychiatric medication(s)     Medication Compliance:   Yes     Changes in Health Issues:   None reported     Chemical Use Review:   Substance Use: Chemical use reviewed, no active concerns identified      Tobacco Use: No current tobacco use.      Diagnosis:  1. Persistent depressive disorder    2. RENETTA (generalized anxiety disorder)        Collateral Reports Completed:   Not Applicable    PLAN: (Patient Tasks / Therapist Tasks / Other)  Patient was asked to practice 5-4-3-2-1 grounding skill when she is feeling anxious. Patient was asked to call behavioral access to schedule an intake for IOP/PHP. We will discuss next session.       CAROLE Owen [facility] 3/22/2021  Service Performed and Documented by ABIEL-   Note reviewed and clinical supervision by CAROLE Vargas [facility] 3/24/2021    ______________________________________________________________________    Treatment Plan    Patient's Name: Divya Kasper  YOB: 1975    Date: 3/2/2021    DSM5 Diagnoses: 300.4 (F34.1) Persistent Depressive Disorder, Severe or 300.02 (F41.1) Generalized Anxiety Disorder  Psychosocial / Contextual Factors: Spiritual Beliefs: \"I still go to [employer] once in a while but I'm not strict about it. I try to get there especially on holidays\". Individual Factors patient's ex lives across the street from her and she sees " him daily, Societal Factors COVID-19 pandemic and Economic Factors patient is currently unemployed.   WHODAS: 19    Referral / Collaboration:  The following referral(s) will be initiated: PHP or IOP.    Anticipated number of session or this episode of care: 12      MeasurableTreatment Goal(s) related to diagnosis / functional impairment(s)  Goal 1: Client will work on stabilizing depressive symptoms as evidenced by PHQ scores (current is 20) and Self report.  Goal is to reduce by five points.      I will know I've met my goal when I can get up out of bed and be up for more than a half hour at a time.      Objective #A    Client will bring to session stressors since last visit to process and formulate coping mechanisms.  Status: New - Date: 3/2/2021    Intervention(s)  Therapist will bring to each session thought patterns that contribute to stress and depression, and develop cognitive restructuring techniques to help manage these thought distortions.     Objective #B  Client will incorporate self-care in everyday life to manage physical and mental health.   Status: New - Date: 3/2/2021    Intervention(s)  Therapist will process through with success and failures related to self-care activities, including breathing exercises, watching movies, spending time with her daughter.     Objective #C  Client will reduce feeling bad about herself by being more aware of cognitive distortions.  Status: New - Date: 3/2/2021    Intervention(s)  Therapist will teach about cognitive distortions, specifically patterns, and look at ways to be more aware of thoughts.        Goal 2: Client will work on stabilizing anxiety symptoms as evidenced by RENETTA-7 scores (current is 17) and Self report.  Goal is to reduce by five points.      I will know I've met my goal when my heart isn't racing and feeling like it's going to beat out of my chest.      Objective #A Client will identify triggers of anxiety and process them in session.    Status: New  "- Date: 3/2/2021    Intervention(s)  Therapist will teach emotional recognition/identification by assigning homework to journal with written exercises.    Objective #B  Client will identify initial signs or symptoms of anxiety.    Status: New - Date: 3/2/2021    Intervention(s)  Therapist will teach emotional regulation skills, such as deep breathing and mindfulness skills.    Objective #C  Client will use thought-stopping strategy daily to reduce intrusive thoughts.  Status: New - Date: 3/2/2021    Intervention(s)  Therapist will provide educational materials on distress tolerance skills and other copings skills.      Goal 3: Client will be monitored for safety concerns (sucidal ideation).     Objective #A  Client will be assessed at each session for suicidal ideation.  Status: New - Date: 3/2/2021     Intervention(s)  Therapist will at each session review SI thoughts, safety plan as needed, and provide appropirate support        Patient has reviewed and agreed to the above plan.      CAROLE Owen [facility]  March 2, 2021  Service Performed and Documented by ABIEL-   tx plan reviewed and clinical supervision by CAROLE Vargas [facility] 3/3/2021  ______________________________________________________                                              Divya Kasper                 SAFETY PLAN:  Step 1: Warning signs / cues (Thoughts, images, mood, situation, behavior) that a crisis may be developing:  ? Thoughts: \"I don't matter\", \"I'm a burden\" and \"I can't do this anymore\"  ? Images: none  ? Thinking Processes: ruminations (can't stop thinking about my problems): over thinking and racing thoughts  ? Mood: worsening depression, hopelessness, helplessness, agitation and mood swings  ? Behaviors: can't stop crying, not taking care of myself and not taking care of my responsibilities  ? Situations: relationship problems and and the holidays   Step 2: Coping strategies - Things I can do to take my mind off of my " "problems without contacting another person (relaxation technique, physical activity):  ? Distress Tolerance Strategies:  play with my pet  and watch TV and play on her phone  ? Physical Activities: go for a walk  ? Focus on helpful thoughts:  unsure  Step 3: People and social settings that provide distraction:                 Name: Matt     Phone: number in personal cell phone                 Name: Nella      Phone: number in personal cell phone                 Name: Ashlee (mom)        Phone: number in personal cell phone  Step 4: Remind myself of people and things that are important to me and worth living for:  \"my kids, they're usually the only thing that reminds me how important I am.\"  Step 5: When I am in crisis, I can ask these people to help me use my safety plan:                 Name: Matt                      Name: Nella                       Name: Ashlee         Step 6: Making the environment safe:   ? be around others  Step 7: Professionals or agencies I can contact during a crisis:  ? [facility] Number: [phone]  ? Suicide Prevention Lifeline: 9-200-706-OPFZ (3734)  ? Crisis Text Line Service (available 24 hours a day, 7 days a week): Text [place] to 536644     Call 911 or go to my nearest emergency department.       I helped develop this safety plan and agree to use it when needed.  I have been given a copy of this plan via [employer].       Client signature _________________________________________________________________  Today s date:  1/28/2021  Adapted from Safety Plan Template 2008 Tanya Barrientos and Pete Gurrola is reprinted with the express permission of the authors.  No portion of the Safety Plan Template may be reproduced without the express, written permission.  You can contact the authors at [email or web address] or [email or web address].  "

## 2021-03-30 ENCOUNTER — TELEPHONE (OUTPATIENT)
Dept: BEHAVIORAL HEALTH | Facility: CLINIC | Age: 46
End: 2021-03-30

## 2021-03-30 NOTE — TELEPHONE ENCOUNTER
Writer received a return phone call from shamar apologizing for not returning previous phone calls.  This was in a voicemail message.    Writer called Pt back today and left another vm requesting a call back to coordinate care and a potential start date.

## 2021-04-02 ENCOUNTER — TELEPHONE (OUTPATIENT)
Dept: PSYCHOLOGY | Facility: CLINIC | Age: 46
End: 2021-04-02

## 2021-04-02 NOTE — TELEPHONE ENCOUNTER
Provider called patient to see if she was still available for her 1:30 PM session. Provider gave patient the phone number to the behavioral access line to schedule if needed. Provider reminded patient of her next scheduled session on 4/26.

## 2021-04-06 ENCOUNTER — TELEPHONE (OUTPATIENT)
Dept: BEHAVIORAL HEALTH | Facility: CLINIC | Age: 46
End: 2021-04-06

## 2021-04-06 NOTE — TELEPHONE ENCOUNTER
Writer called Pt today to discuss the option of starting in the ADT program.  She answered and stated she is interested.  She will start tomorrow.  Completed her admission and answered all questions.  Will inform the team of her admission.

## 2021-04-08 ENCOUNTER — TELEPHONE (OUTPATIENT)
Dept: BEHAVIORAL HEALTH | Facility: CLINIC | Age: 46
End: 2021-04-08

## 2021-04-08 NOTE — TELEPHONE ENCOUNTER
Writer called to conduct admission. Left VM requesting call back. Will reattempt.  Kandace Arteaga RN on 4/8/2021 at 3:32 PM

## 2021-04-09 ENCOUNTER — TELEPHONE (OUTPATIENT)
Dept: BEHAVIORAL HEALTH | Facility: CLINIC | Age: 46
End: 2021-04-09

## 2021-04-09 NOTE — TELEPHONE ENCOUNTER
Writer called to conduct admission.  Left VM requesting call back. Will reattempt.  Also sent [employer] message.  Kandace Arteaga RN on 4/9/2021 at 10:04 AM

## 2021-04-09 NOTE — TELEPHONE ENCOUNTER
Writer called to conduct admission. Pt reported experiencing family emergency this AM and requested to start next week.     Writer shared with team and will reach out to pt on Monday morning.     Kandace Arteaga RN on 4/9/2021 at 12:10 PM

## 2021-04-12 ENCOUNTER — TELEPHONE (OUTPATIENT)
Dept: BEHAVIORAL HEALTH | Facility: CLINIC | Age: 46
End: 2021-04-12

## 2021-04-12 NOTE — TELEPHONE ENCOUNTER
Writer called to conduct admission. Left VM requesting call back. Will reattempt.  Kandace Arteaga RN on 4/12/2021 at 11:16 AM

## 2021-04-12 NOTE — TELEPHONE ENCOUNTER
Writer left VM expressing need to reschedule admission call to the 11am hour today rather than the 9am hour, d/t group scheduling. Gave callback #  Kandace Arteaga RN on 4/12/2021 at 8:31 AM

## 2021-04-13 ENCOUNTER — TELEPHONE (OUTPATIENT)
Dept: BEHAVIORAL HEALTH | Facility: CLINIC | Age: 46
End: 2021-04-13

## 2021-04-13 NOTE — TELEPHONE ENCOUNTER
Writer called to conduct admission. Left VM requesting call back. Will reattempt.  Kandace Arteaga RN on 4/13/2021 at 9:40 AM

## 2021-04-13 NOTE — TELEPHONE ENCOUNTER
Writer called to conduct admission. Left VM requesting call back. Will reattempt.  Kandace Arteaga RN on 4/13/2021 at 12:16 PM

## 2021-04-14 ENCOUNTER — TELEPHONE (OUTPATIENT)
Dept: BEHAVIORAL HEALTH | Facility: CLINIC | Age: 46
End: 2021-04-14

## 2021-04-14 NOTE — TELEPHONE ENCOUNTER
Writer called to conduct admission and/or see if pt was still interested in starting the program. Writer indicated that pt's spot in the program would be held through today and to please let us know how pt would like to proceed. Gave program phn #.  Kandace Arteaga RN on 4/14/2021 at 11:27 AM

## 2021-04-26 ENCOUNTER — FCC EXTENDED DOCUMENTATION (OUTPATIENT)
Dept: PSYCHOLOGY | Facility: CLINIC | Age: 46
End: 2021-04-26

## 2021-04-26 ENCOUNTER — TELEPHONE (OUTPATIENT)
Dept: PSYCHOLOGY | Facility: CLINIC | Age: 46
End: 2021-04-26

## 2021-04-26 NOTE — PROGRESS NOTES
"                    Discharge Summary  Multiple Sessions    Client Name: Divya Kasper MRN#: [id number] YOB: 1975      Intake / Discharge Date: 1/28/2021-4/26/2021      DSM5 Diagnoses: (Sustained by DSM5 Criteria Listed Above)  Diagnoses: 300.4 (F34.1) Persistent Depressive Disorder, Severe  300.02 (F41.1) Generalized Anxiety Disorder  Psychosocial & Contextual Factors: Spiritual Beliefs: \"I still go to [employer] once in a while but I'm not strict about it. I try to get there especially on holidays\". Individual Factors patient's ex lives across the street from her and she sees him daily, Societal Factors COVID-19 pandemic and Economic Factors patient is currently unemployed.   WHODAS 2.0 (12 item) Score: 19          Presenting Concern:  Symptoms of depression and anxiety.       Reason for Discharge:  Noncompliance with attendance agreement.       Disposition at Time of Last Encounter:   Comments: friendly and engaged.      Risk Management:   Client has had a history of suicidal ideation: patient reported history of passive SI during [place].   A safety and risk management plan has been developed including: Patient consented to co-developed safety plan.  Safety and risk management plan was completed.  Patient agreed to use safety plan should any safety concerns arise.  A copy was given to the patient.      Referred To:  None.         CAROLE Owen [facility]   4/26/2021    "

## 2021-04-26 NOTE — LETTER
[facility]  [address] [place] [address]  Tel [phone]  Fax [phone]       4/26/2021      Divya FRANCES Ranjith  [address]  [place] [address]     At the start of therapy with [facility] we ask clients to prioritize their mental health care with an attendance agreement.  In this agreement clients make a commitment to follow through with scheduled appointments. If a person cancels an appointment within 24 hours of the appointment time or does not make their appointment and does not contact the clinic it is considered a violation of the agreement.    It has come to the attention of [facility] administration that you were unable to honor that agreement. Regretfully, we have to inform you that we are discharging you from [facility] due to multiple missed appointments. Any future scheduled appointments have been cancelled.   If you wish to continue therapy with [facility] and are ready to make a commitment to regular attendance, you may call back in six months and we will be happy to review a new agreement with you.     To access current services we recommend that you contact your insurance plan s provider network to identify a therapist with whom you can schedule services. For your convenience we have also listed contact information for two agencies which provide mental health services around the [facility] area.      [facility]  [place] [facility]  [place] [phone] [place] [phone]  [place] [phone] [place] [phone]  [place] [phone] [place] [phone]  [place] [phone] [place] [phone]  [place] [phone]     Sincerely,      [facility]  Administration  [phone]

## 2021-04-26 NOTE — TELEPHONE ENCOUNTER
Provider called patient to see if she was still available for her 2:30 PM session today. Provider reminded patient of the attendance agreement and informed patient that this is the second session that she has not shown up for. Provider informed patient that she will be discharged from provider's case load due to non adherence to the attendance agreement. Provider has canceled all future appointments for patient.

## 2021-05-24 ENCOUNTER — RECORDS - HEALTHEAST (OUTPATIENT)
Dept: ADMINISTRATIVE | Facility: CLINIC | Age: 46
End: 2021-05-24

## 2021-10-11 ENCOUNTER — HEALTH MAINTENANCE LETTER (OUTPATIENT)
Age: 46
End: 2021-10-11

## 2022-01-27 ENCOUNTER — OFFICE VISIT (OUTPATIENT)
Dept: FAMILY MEDICINE | Facility: CLINIC | Age: 47
End: 2022-01-27
Payer: COMMERCIAL

## 2022-01-27 VITALS
OXYGEN SATURATION: 98 % | TEMPERATURE: 97.9 F | SYSTOLIC BLOOD PRESSURE: 114 MMHG | DIASTOLIC BLOOD PRESSURE: 80 MMHG | WEIGHT: 159 LBS | HEART RATE: 95 BPM

## 2022-01-27 DIAGNOSIS — J01.90 ACUTE SINUSITIS, RECURRENCE NOT SPECIFIED, UNSPECIFIED LOCATION: Primary | ICD-10-CM

## 2022-01-27 DIAGNOSIS — J34.89 SINUS DRAINAGE: ICD-10-CM

## 2022-01-27 DIAGNOSIS — J34.89 SINUS PRESSURE: ICD-10-CM

## 2022-01-27 DIAGNOSIS — R05.9 COUGH: ICD-10-CM

## 2022-01-27 PROCEDURE — 99203 OFFICE O/P NEW LOW 30 MIN: CPT | Performed by: FAMILY MEDICINE

## 2022-01-27 NOTE — PATIENT INSTRUCTIONS
Patient Education     Understanding Acute Rhinosinusitis  Acute rhinosinusitis is when the lining of the inside of the nose and the sinuses becomes irritated and swollen. It is also called sinusitis, or a sinus infection.  Sinuses are air-filled spaces in the skull behind the face. They are kept moist and clean by a lining of mucosa. Things such as pollen, smoke, and chemical fumes can irritate the mucosa. It can then swell up. As a response to irritation, the mucosa makes more mucus and other fluids. Tiny hairlike cilia cover the mucosa. Cilia help carry mucus toward the opening of the sinus. Too much mucus may cause the cilia to stop working. This blocks the sinus opening. A buildup of fluid in the sinuses then causes pain and pressure. It can also cause bacteria to grow in the sinuses.    What causes acute rhinosinusitis?  A sinus infection is most often caused by a virus. You are more likely to get one after having a cold or the flu. In some cases, a sinus infection can be caused by bacteria.  You are at higher risk for a sinus infection if you:    Are older in age    Have structural problems with your sinuses    Smoke or are exposed to secondhand smoke    Are exposed to changes in pressure, such as from flying a lot or deep sea diving    Have asthma or allergies    Have a weak immune system    Have dental disease     Symptoms of acute rhinosinusitis  Symptoms of acute rhinosinusitis often last around 7 to 10 days. If you have a bacterial infection, they may last longer. They may also get better but then worsen. You may have:    Face pain or pressure under the eyes and around the nose    Headache    Fluid draining in the back of the throat (postnasal drip)    Congestion    Drainage that is thick and colored (often green), instead of clear    Cough    Problems with your sense of smell    Ear pain or hearing problems    Fever    Tooth pain    Fatigue  Diagnosing acute rhinosinusitis  Your healthcare provider will  ask about your symptoms and past health. He or she will look at your ears, nose, throat, and sinuses. Imaging tests, such as X-rays, are often not needed.  It can be hard to figure out if a sinus infection is caused by a virus or bacterium. A bacterial infection tends to last longer. Symptoms may also get better but then worsen. Your healthcare provider may take a sample of mucus from your nose to check for bacteria.  Treating acute rhinosinusitis  Most sinus infections will go away within 10 days. Your body will fight off the virus. If your symptoms seem to get better but then worsen, you may have a bacterial infection instead. Your healthcare provider will then give you antibiotics. Take this medicine until it is gone, even if you feel better.  To help ease your symptoms, your healthcare provider may advise:    Over-the-counter pain relievers. Medicines such as acetaminophen or ibuprofen can ease sinus pain. They may also lower a fever.    Nasal washes. Washing your nasal passages with salt water may ease pain and pressure. It can rinse out mucous and other irritants from your sinuses. Your healthcare provider can show you how to do it.    Nasal steroid spray. This prescription medicine can reduce inflammation in your sinuses.    Other medicines. Decongestants, antihistamines, and other nasal sprays may give short-term relief. They may help with congestion. Talk with your healthcare provider before taking these medicines.     Preventing acute rhinosinusitis  You can help prevent a sinus infection with these steps:    Wash your hands well and often.    Stay away from people who have a cold or upper respiratory infection.    Don't smoke. And stay away from secondhand smoke.    Use a humidifier at home.    Make sure you are up-to-date on your vaccines, such as the flu shot.     When to call your healthcare provider  Call your healthcare provider right away if you have any of these:    Fever of 100.4 F (38 C) or  higher, or as directed by your healthcare provider    Pain that gets worse    Symptoms that don t get better, or get worse    New symptoms  [employer] last reviewed this educational content on 6/1/2019 2000-2021 [employer]. All rights reserved. This information is not intended as a substitute for professional medical care. Always follow your healthcare professional's instructions.

## 2022-01-27 NOTE — PROGRESS NOTES
Assessment & Plan     (J01.90) Acute sinusitis, recurrence not specified, unspecified location  (primary encounter diagnosis)  Comment: 46 year old female has sinus pressure and drainage for 11 days and getting worse. Has covid positive at 1/11/2022  Plan: will take abx. Side effect addressed. Advise rest and push fluid. If symptoms worse she will follow-up and call.     (J34.89) Sinus drainage  Comment: thick drainage and symptoms of sinusitis   Plan: take abx    (R05.9) Cough  Comment: is improving   Plan: otc cough mediation     (J34.89) Sinus pressure  Comment: moderate sinus pressure  Plan: motrin prn     advise to get covid vaccine.     Return if symptoms worsen or fail to improve.    Oxana Munoz MD  [facility] SHONNA Stokes is a 46 year old who presents for the following health issues     HPI       Pt has sinuses pressure and drainage for 11 days and getting worse. Mild cough and cough is better. She has left ear pain for several days.She has covid  At 1/11/2022. No fever, wheezing and sob. No covid vaccine.  No headache, cp, vision change    Has not tried otc medication yet.     Review of Systems   Constitutional, HEENT, cardiovascular, pulmonary, gi and gu systems are negative, except as otherwise noted.      Objective    /80   Pulse 95   Temp 97.9  F (36.6  C)   Wt 72.1 kg (159 lb)   SpO2 98%   Breastfeeding No   There is no height or weight on file to calculate BMI.  Physical Exam   GENERAL: healthy, alert and no distress  HENT: ear canals and TM's normal, nose and mouth without ulcers or lesions. throat normal . Sinus moderate tenderness.   NECK: no adenopathy, no asymmetry, masses, or scars and thyroid normal to palpation  RESP: lungs clear to auscultation - no rales, rhonchi or wheezes  CV: regular rate and rhythm, normal S1 S2, no S3 or S4, no murmur, click or rub, no peripheral edema and peripheral pulses strong

## 2022-03-27 ENCOUNTER — HEALTH MAINTENANCE LETTER (OUTPATIENT)
Age: 47
End: 2022-03-27

## 2022-03-30 ENCOUNTER — HOSPITAL ENCOUNTER (EMERGENCY)
Facility: CLINIC | Age: 47
Discharge: HOME | End: 2022-03-30
Attending: EMERGENCY MEDICINE | Admitting: EMERGENCY MEDICINE
Payer: COMMERCIAL

## 2022-03-30 VITALS
DIASTOLIC BLOOD PRESSURE: 90 MMHG | OXYGEN SATURATION: 100 % | SYSTOLIC BLOOD PRESSURE: 135 MMHG | WEIGHT: 158 LBS | BODY MASS INDEX: 26.98 KG/M2 | HEART RATE: 102 BPM | TEMPERATURE: 98.3 F | RESPIRATION RATE: 18 BRPM | HEIGHT: 64 IN

## 2022-03-30 DIAGNOSIS — R79.89 ELEVATED LFTS: ICD-10-CM

## 2022-03-30 LAB
ALBUMIN SERPL-MCNC: 4.1 G/DL (ref 3.5–5)
ALP SERPL-CCNC: 153 U/L (ref 45–120)
ALT SERPL W P-5'-P-CCNC: 862 U/L (ref 0–45)
ANION GAP SERPL CALCULATED.3IONS-SCNC: 13 MMOL/L (ref 5–18)
AST SERPL W P-5'-P-CCNC: 618 U/L (ref 0–40)
BASOPHILS # BLD AUTO: 0 10E3/UL (ref 0–0.2)
BASOPHILS NFR BLD AUTO: 1 %
BILIRUB DIRECT SERPL-MCNC: 0.4 MG/DL
BILIRUB SERPL-MCNC: 1.3 MG/DL (ref 0–1)
BUN SERPL-MCNC: 9 MG/DL (ref 8–22)
CALCIUM SERPL-MCNC: 9.2 MG/DL (ref 8.5–10.5)
CHLORIDE BLD-SCNC: 104 MMOL/L (ref 98–107)
CO2 SERPL-SCNC: 22 MMOL/L (ref 22–31)
CREAT SERPL-MCNC: 0.92 MG/DL (ref 0.6–1.1)
EOSINOPHIL # BLD AUTO: 0.1 10E3/UL (ref 0–0.7)
EOSINOPHIL NFR BLD AUTO: 3 %
ERYTHROCYTE [DISTWIDTH] IN BLOOD BY AUTOMATED COUNT: 12.9 % (ref 10–15)
GFR SERPL CREATININE-BSD FRML MDRD: 78 ML/MIN/1.73M2
GLUCOSE BLD-MCNC: 106 MG/DL (ref 70–125)
HCT VFR BLD AUTO: 40.3 % (ref 35–47)
HGB BLD-MCNC: 13.7 G/DL (ref 11.7–15.7)
IMM GRANULOCYTES # BLD: 0 10E3/UL
IMM GRANULOCYTES NFR BLD: 0 %
INR PPP: 1.03 (ref 0.85–1.15)
LIPASE SERPL-CCNC: 93 U/L (ref 0–52)
LYMPHOCYTES # BLD AUTO: 1.4 10E3/UL (ref 0.8–5.3)
LYMPHOCYTES NFR BLD AUTO: 31 %
MCH RBC QN AUTO: 30.2 PG (ref 26.5–33)
MCHC RBC AUTO-ENTMCNC: 34 G/DL (ref 31.5–36.5)
MCV RBC AUTO: 89 FL (ref 78–100)
MONOCYTES # BLD AUTO: 0.4 10E3/UL (ref 0–1.3)
MONOCYTES NFR BLD AUTO: 9 %
NEUTROPHILS # BLD AUTO: 2.4 10E3/UL (ref 1.6–8.3)
NEUTROPHILS NFR BLD AUTO: 56 %
NRBC # BLD AUTO: 0 10E3/UL
NRBC BLD AUTO-RTO: 0 /100
PLATELET # BLD AUTO: 252 10E3/UL (ref 150–450)
POTASSIUM BLD-SCNC: 3.8 MMOL/L (ref 3.5–5)
PROT SERPL-MCNC: 7.8 G/DL (ref 6–8)
RBC # BLD AUTO: 4.54 10E6/UL (ref 3.8–5.2)
SODIUM SERPL-SCNC: 139 MMOL/L (ref 136–145)
WBC # BLD AUTO: 4.3 10E3/UL (ref 4–11)

## 2022-03-30 PROCEDURE — 36415 COLL VENOUS BLD VENIPUNCTURE: CPT | Performed by: EMERGENCY MEDICINE

## 2022-03-30 PROCEDURE — 83690 ASSAY OF LIPASE: CPT | Performed by: EMERGENCY MEDICINE

## 2022-03-30 PROCEDURE — 82248 BILIRUBIN DIRECT: CPT | Performed by: EMERGENCY MEDICINE

## 2022-03-30 PROCEDURE — 80074 ACUTE HEPATITIS PANEL: CPT | Performed by: EMERGENCY MEDICINE

## 2022-03-30 PROCEDURE — 85025 COMPLETE CBC W/AUTO DIFF WBC: CPT | Performed by: EMERGENCY MEDICINE

## 2022-03-30 PROCEDURE — 80053 COMPREHEN METABOLIC PANEL: CPT | Performed by: EMERGENCY MEDICINE

## 2022-03-30 PROCEDURE — 85610 PROTHROMBIN TIME: CPT | Performed by: EMERGENCY MEDICINE

## 2022-03-30 PROCEDURE — 99283 EMERGENCY DEPT VISIT LOW MDM: CPT

## 2022-03-30 ASSESSMENT — ENCOUNTER SYMPTOMS
ABDOMINAL PAIN: 0
FEVER: 0
HEMATURIA: 1
DYSURIA: 1
CHILLS: 0
DIARRHEA: 0
COUGH: 0
VOMITING: 0
FREQUENCY: 1
DIAPHORESIS: 0
NAUSEA: 0

## 2022-03-31 NOTE — ED TRIAGE NOTES
Pt states she was seen for abdominal pain x2 days ago. Blood work showed elevated liver enzymes. Back today for US and blood work which showed double in liver enzymes. Urgent care doctor told pt to come to ER for imaging. Denies any abdominal pain at this time. Gallstones present per pt.

## 2022-03-31 NOTE — ED PROVIDER NOTES
EMERGENCY DEPARTMENT ENCOUNTER      NAME: Divya Kasper  AGE: 46 year old female  YOB: 1975  MRN: [id number]  EVALUATION DATE & TIME: 3/30/2022  8:41 PM    PCP: No primary care provider on file.    ED PROVIDER: Jen Khan MD      Chief Complaint   Patient presents with     Abnormal Labs         FINAL IMPRESSION:  1. Elevated LFTs          ED COURSE & MEDICAL DECISION MAKING:    Pertinent Labs & Imaging studies reviewed. (See chart for details)  46 year old female presents to the Emergency Department for evaluation of elevated LFT's.  Yesterday afternoon the patient had an epic episode of epigastric pain radiating into the back for lasted less than an hour.  She was seen in urgent care and had elevated LFTs.  She was then reseen today for a right upper quadrant ultrasound and had progressive elevation of her LFTs.  She was called this evening and recommended she come to the emergency department.  Currently, her LFTs appear to be downtrending.  She is not having any abdominal pain and has had no acute exacerbations of pain.  She currently is not in any pain.  She has not had any constitutional symptoms.  On exam, no reproducible abdominal tenderness.  I spoke with GI.  They would recommend she follow-up for repeat check of her LFTs and would not recommend any further work-up in the emergency department if she is not having any active symptoms.    8:48 PM I met with the patient, obtained history, performed an initial exam, and discussed options and plan for diagnostics and treatment here in the ED. PPE worn including surgical mask, surgical gloves.  9:45 PM I rechecked and updated the patient on results.  9:52 PM I spoke with Dr. Acharya from [facility].  10:06 PM I updated the patient with my discussion with GI and recommend follow-up on Friday as scheduled for repeat check of LFT's.     At the conclusion of the encounter I discussed the results of all of the tests and the disposition. The  questions were answered. The patient or family acknowledged understanding and was agreeable with the care plan.       MEDICATIONS GIVEN IN THE EMERGENCY:  Medications - No data to display    NEW PRESCRIPTIONS STARTED AT TODAY'S ER VISIT  New Prescriptions    No medications on file          =================================================================    HPI    Patient information was obtained from: Patient    Use of : N/A        Divya Kasper is a 46 year old female with a pertinent history of GERD, hypertension who presents to this ED by walk in with her mother for evaluation of abnormal labs. Patient reports she developed epigastric abdominal pain yesterday at around 11:30 AM which radiated into her back. Patient took ibuprofen and some of her GERD medications. Patient then reported to the Urgency Room for evaluation, but states her abdominal pain was resolved by the time she arrived. Pain lasted for less than 1 hour and it has not recurred since then. Patient did have LFTs drawn at that time where were elevated. Today, she reported to the Urgency Room again and had an ultrasound of her gallbladder which found non-obstructing gallstones, but reports she was told she would not need an emergent surgery, but would have to have a cholecystectomy later on. Repeat LFTs were drawn during her UR visit earlier today at approximately 12 PM. She states she was called by the UR earlier today at 7 PM and told her LFT had doubled from their original values and they recommended she report to the ED for further evaluation.    Of note, patient reports she was diagnosed with a UTI during her first UR visit and was prescribed keflex. Patient endorses dysuria, frequency, and urgency. Patient also reports she had hematuria earlier tonight prior to ED arrival. Patient endorses a history of UTIs in the past, but states she has not had hematuria with her UTIs previously.    She states she is on metoprolol for  palpitations, an antihypertensive medication, and two medications for anxiety. No recent changes to her medications. No nausea, vomiting, fever, chills, sweats, cough, congestion, diarrhea, or other recent illness. No surgical history.    SHx- Denies tobacco, alcohol use.    Per chart review, patient was seen at [facility] on 03/29/22 (yesterday). Patient presented with epigastric pain radiating to the back. Associated nausea and vomiting (x1), but attributed to anxiety. Liver panel remarkable for an ALT of 552, AST 1131. Bilirubin direct mildly elevated at 0.7. Tbili just shy of abnormal at 1.2, but still WNL. UA with signs of infection, a positive nitrite, but the patient did not report dysuria, flank pain, or hematuria at that time. Patient was discharged on a 7 day course of keflex.  Patient was seen at the same UR and had an abdominal ultrasound which found Multiple gallstones in an otherwise normal gallbladder. No bile duct dilatation. Repeat liver panel remarkable for an alk phos of 159, ALT 1056, AST 1034, Tbili 1.8, bilirubin direct 1.1.    REVIEW OF SYSTEMS   Review of Systems   Constitutional: Negative for chills, diaphoresis and fever.   HENT: Negative for congestion.    Respiratory: Negative for cough.    Gastrointestinal: Negative for abdominal pain (resolved, epigastric), diarrhea, nausea and vomiting.   Genitourinary: Positive for dysuria, frequency, hematuria and urgency.   All other systems reviewed and are negative.      PAST MEDICAL HISTORY:  History reviewed. No pertinent past medical history.    PAST SURGICAL HISTORY:  History reviewed. No pertinent surgical history.        CURRENT MEDICATIONS:    LORazepam (ATIVAN) 0.5 MG tablet  metoprolol succinate ER (TOPROL-XL) 50 MG 24 hr tablet  venlafaxine (EFFEXOR) 75 MG tablet        ALLERGIES:  Allergies   Allergen Reactions     Sulfa Drugs Diarrhea and Dizziness       FAMILY HISTORY:  History reviewed. No pertinent family  "history.    SOCIAL HISTORY:   Social History     Socioeconomic History     Marital status: Single     Spouse name: Not on file     Number of children: Not on file     Years of education: Not on file     Highest education level: Not on file   Occupational History     Not on file   Tobacco Use     Smoking status: Never Smoker     Smokeless tobacco: Never Used   Substance and Sexual Activity     Alcohol use: Not on file     Drug use: Not on file     Sexual activity: Not on file   Other Topics Concern     Not on file   Social History Narrative     Not on file     Social Determinants of Health     Financial Resource Strain: Not on file   Food Insecurity: Not on file   Transportation Needs: Not on file   Physical Activity: Not on file   Stress: Not on file   Social Connections: Not on file   Intimate Partner Violence: Not on file   Housing Stability: Not on file       VITALS:  BP (!) 158/104   Pulse (!) 125   Temp 98.3  F (36.8  C) (Oral)   Resp 18   Ht 1.626 m (5' 4\")   Wt 71.7 kg (158 lb)   SpO2 98%   BMI 27.12 kg/m      PHYSICAL EXAM    Constitutional: Well developed, Well nourished, NAD  HENT: Normocephalic, Atraumatic, Bilateral external ears normal, Oropharynx normal, mucous membranes moist, Nose normal.   Neck- Normal range of motion, No tenderness, Supple, No stridor.  Eyes: PERRL, EOMI, Conjunctiva normal, No discharge.   Respiratory: Normal breath sounds, No respiratory distress  Cardiovascular: Normal heart rate, Regular rhythm  GI: Bowel sounds normal, Soft, No tenderness,   Musculoskeletal: No edema. Good range of motion in all major joints. No tenderness to palpation or major deformities noted.   Integument: Warm, Dry, No erythema, No rash  Neurologic: Alert & oriented x 3, Normal motor function, Normal sensory function, No focal deficits noted. Normal gait.   Psychiatric: Affect normal, Judgment normal, Mood normal.      LAB:  All pertinent labs reviewed and interpreted.  Results for orders placed or " performed during the hospital encounter of 03/30/22   Result Value Ref Range    INR 1.03 0.85 - 1.15   Hepatic function panel   Result Value Ref Range    Bilirubin Total 1.3 (H) 0.0 - 1.0 mg/dL    Bilirubin Direct 0.4 <=0.5 mg/dL    Protein Total 7.8 6.0 - 8.0 g/dL    Albumin 4.1 3.5 - 5.0 g/dL    Alkaline Phosphatase 153 (H) 45 - 120 U/L     (H) 0 - 40 U/L     (H) 0 - 45 U/L   Result Value Ref Range    Lipase 93 (H) 0 - 52 U/L   Basic metabolic panel   Result Value Ref Range    Sodium 139 136 - 145 mmol/L    Potassium 3.8 3.5 - 5.0 mmol/L    Chloride 104 98 - 107 mmol/L    Carbon Dioxide (CO2) 22 22 - 31 mmol/L    Anion Gap 13 5 - 18 mmol/L    Urea Nitrogen 9 8 - 22 mg/dL    Creatinine 0.92 0.60 - 1.10 mg/dL    Calcium 9.2 8.5 - 10.5 mg/dL    Glucose 106 70 - 125 mg/dL    GFR Estimate 78 >60 mL/min/1.73m2   CBC with platelets and differential   Result Value Ref Range    WBC Count 4.3 4.0 - 11.0 10e3/uL    RBC Count 4.54 3.80 - 5.20 10e6/uL    Hemoglobin 13.7 11.7 - 15.7 g/dL    Hematocrit 40.3 35.0 - 47.0 %    MCV 89 78 - 100 fL    MCH 30.2 26.5 - 33.0 pg    MCHC 34.0 31.5 - 36.5 g/dL    RDW 12.9 10.0 - 15.0 %    Platelet Count 252 150 - 450 10e3/uL    % Neutrophils 56 %    % Lymphocytes 31 %    % Monocytes 9 %    % Eosinophils 3 %    % Basophils 1 %    % Immature Granulocytes 0 %    NRBCs per 100 WBC 0 <1 /100    Absolute Neutrophils 2.4 1.6 - 8.3 10e3/uL    Absolute Lymphocytes 1.4 0.8 - 5.3 10e3/uL    Absolute Monocytes 0.4 0.0 - 1.3 10e3/uL    Absolute Eosinophils 0.1 0.0 - 0.7 10e3/uL    Absolute Basophils 0.0 0.0 - 0.2 10e3/uL    Absolute Immature Granulocytes 0.0 <=0.4 10e3/uL    Absolute NRBCs 0.0 10e3/uL       RADIOLOGY:  Reviewed all pertinent imaging. Please see official radiology report.  No orders to display         I, Rip Posada, am serving as a scribe to document services personally performed by Jen Khan, based on my observation and the provider's statements to  me. I, Jen Khan MD, attest that Rip Posada is acting in a scribe capacity, has observed my performance of the services and has documented them in accordance with my direction.    Jen Khan MD  Emergency Medicine  [facility] EMERGENCY ROOM  [address] [address]  [phone]     Jen Khan MD  03/30/22 6903

## 2022-04-01 LAB
HAV IGM SERPL QL IA: NEGATIVE
HBV CORE IGM SERPL QL IA: NEGATIVE
HBV SURFACE AG SERPL QL IA: NONREACTIVE
HCV AB SERPL QL IA: NEGATIVE

## 2022-09-24 ENCOUNTER — HEALTH MAINTENANCE LETTER (OUTPATIENT)
Age: 47
End: 2022-09-24

## 2023-05-08 ENCOUNTER — HEALTH MAINTENANCE LETTER (OUTPATIENT)
Age: 48
End: 2023-05-08

## 2023-10-14 ENCOUNTER — HEALTH MAINTENANCE LETTER (OUTPATIENT)
Age: 48
End: 2023-10-14

## 2024-05-11 ENCOUNTER — HEALTH MAINTENANCE LETTER (OUTPATIENT)
Age: 49
End: 2024-05-11

## 2024-11-29 ENCOUNTER — APPOINTMENT (OUTPATIENT)
Dept: RADIOLOGY | Facility: CLINIC | Age: 49
End: 2024-11-29
Payer: COMMERCIAL

## 2024-11-29 ENCOUNTER — HOSPITAL ENCOUNTER (INPATIENT)
Facility: CLINIC | Age: 49
LOS: 3 days | Discharge: HOME | End: 2024-12-02
Admitting: FAMILY MEDICINE
Payer: COMMERCIAL

## 2024-11-29 ENCOUNTER — APPOINTMENT (OUTPATIENT)
Dept: CT IMAGING | Facility: CLINIC | Age: 49
End: 2024-11-29
Attending: PHYSICIAN ASSISTANT
Payer: COMMERCIAL

## 2024-11-29 DIAGNOSIS — N20.0 NEPHROLITHIASIS: Primary | ICD-10-CM

## 2024-11-29 DIAGNOSIS — N17.9 AKI (ACUTE KIDNEY INJURY) (H): ICD-10-CM

## 2024-11-29 DIAGNOSIS — N13.2 URETERAL STONE WITH HYDRONEPHROSIS: ICD-10-CM

## 2024-11-29 DIAGNOSIS — N39.0 UTI (URINARY TRACT INFECTION): ICD-10-CM

## 2024-11-29 DIAGNOSIS — E87.1 HYPONATREMIA: ICD-10-CM

## 2024-11-29 DIAGNOSIS — R39.9 URINARY TRACT INFECTION SYMPTOMS: ICD-10-CM

## 2024-11-29 PROBLEM — G44.89 OTHER HEADACHE SYNDROME: Status: ACTIVE | Noted: 2024-11-29

## 2024-11-29 PROBLEM — R11.2 N&V (NAUSEA AND VOMITING): Status: ACTIVE | Noted: 2024-11-29

## 2024-11-29 LAB
ALBUMIN UR-MCNC: 200 MG/DL
ANION GAP SERPL CALCULATED.3IONS-SCNC: 14 MMOL/L (ref 7–15)
APPEARANCE UR: ABNORMAL
ATRIAL RATE - MUSE: 106 BPM
BACTERIA #/AREA URNS HPF: ABNORMAL /HPF
BASOPHILS # BLD AUTO: 0 10E3/UL (ref 0–0.2)
BASOPHILS NFR BLD AUTO: 0 %
BILIRUB UR QL STRIP: NEGATIVE
BUN SERPL-MCNC: 12.4 MG/DL (ref 6–20)
CALCIUM SERPL-MCNC: 8.4 MG/DL (ref 8.8–10.4)
CHLORIDE SERPL-SCNC: 96 MMOL/L (ref 98–107)
COLOR UR AUTO: YELLOW
CREAT SERPL-MCNC: 1.1 MG/DL (ref 0.51–0.95)
DIASTOLIC BLOOD PRESSURE - MUSE: 75 MMHG
EGFRCR SERPLBLD CKD-EPI 2021: 62 ML/MIN/1.73M2
EOSINOPHIL # BLD AUTO: 0.1 10E3/UL (ref 0–0.7)
EOSINOPHIL NFR BLD AUTO: 1 %
ERYTHROCYTE [DISTWIDTH] IN BLOOD BY AUTOMATED COUNT: 12.7 % (ref 10–15)
GLUCOSE SERPL-MCNC: 121 MG/DL (ref 70–99)
GLUCOSE UR STRIP-MCNC: NEGATIVE MG/DL
HCG UR QL: NEGATIVE
HCO3 SERPL-SCNC: 21 MMOL/L (ref 22–29)
HCT VFR BLD AUTO: 35.7 % (ref 35–47)
HGB BLD-MCNC: 12.4 G/DL (ref 11.7–15.7)
HGB UR QL STRIP: ABNORMAL
IMM GRANULOCYTES # BLD: 0.1 10E3/UL
IMM GRANULOCYTES NFR BLD: 1 %
INTERPRETATION ECG - MUSE: NORMAL
KETONES UR STRIP-MCNC: 40 MG/DL
LACTATE SERPL-SCNC: 2 MMOL/L (ref 0.7–2)
LEUKOCYTE ESTERASE UR QL STRIP: ABNORMAL
LYMPHOCYTES # BLD AUTO: 0.6 10E3/UL (ref 0.8–5.3)
LYMPHOCYTES NFR BLD AUTO: 7 %
MCH RBC QN AUTO: 30.8 PG (ref 26.5–33)
MCHC RBC AUTO-ENTMCNC: 34.7 G/DL (ref 31.5–36.5)
MCV RBC AUTO: 89 FL (ref 78–100)
MONOCYTES # BLD AUTO: 0.5 10E3/UL (ref 0–1.3)
MONOCYTES NFR BLD AUTO: 5 %
MUCOUS THREADS #/AREA URNS LPF: PRESENT /LPF
NEUTROPHILS # BLD AUTO: 8 10E3/UL (ref 1.6–8.3)
NEUTROPHILS NFR BLD AUTO: 87 %
NITRATE UR QL: NEGATIVE
NRBC # BLD AUTO: 0 10E3/UL
NRBC BLD AUTO-RTO: 0 /100
P AXIS - MUSE: 38 DEGREES
PH UR STRIP: 6 [PH] (ref 5–7)
PLATELET # BLD AUTO: 167 10E3/UL (ref 150–450)
POTASSIUM SERPL-SCNC: 4.2 MMOL/L (ref 3.4–5.3)
PR INTERVAL - MUSE: 166 MS
QRS DURATION - MUSE: 82 MS
QT - MUSE: 332 MS
QTC - MUSE: 441 MS
R AXIS - MUSE: 87 DEGREES
RBC # BLD AUTO: 4.03 10E6/UL (ref 3.8–5.2)
RBC URINE: 155 /HPF
SODIUM SERPL-SCNC: 131 MMOL/L (ref 135–145)
SP GR UR STRIP: 1.02 (ref 1–1.03)
SQUAMOUS EPITHELIAL: 17 /HPF
SYSTOLIC BLOOD PRESSURE - MUSE: 122 MMHG
T AXIS - MUSE: -31 DEGREES
TRANSITIONAL EPI: <1 /HPF
UROBILINOGEN UR STRIP-MCNC: 4 MG/DL
VENTRICULAR RATE- MUSE: 106 BPM
WBC # BLD AUTO: 9.2 10E3/UL (ref 4–11)
WBC URINE: 73 /HPF

## 2024-11-29 PROCEDURE — 99285 EMERGENCY DEPT VISIT HI MDM: CPT | Mod: 25

## 2024-11-29 PROCEDURE — 80048 BASIC METABOLIC PNL TOTAL CA: CPT | Performed by: PHYSICIAN ASSISTANT

## 2024-11-29 PROCEDURE — 99222 1ST HOSP IP/OBS MODERATE 55: CPT | Mod: 25 | Performed by: UROLOGY

## 2024-11-29 PROCEDURE — 87186 SC STD MICRODIL/AGAR DIL: CPT

## 2024-11-29 PROCEDURE — 82565 ASSAY OF CREATININE: CPT | Performed by: PHYSICIAN ASSISTANT

## 2024-11-29 PROCEDURE — 87040 BLOOD CULTURE FOR BACTERIA: CPT | Performed by: PHYSICIAN ASSISTANT

## 2024-11-29 PROCEDURE — 96361 HYDRATE IV INFUSION ADD-ON: CPT

## 2024-11-29 PROCEDURE — 0T778DZ DILATION OF LEFT URETER WITH INTRALUMINAL DEVICE, VIA NATURAL OR ARTIFICIAL OPENING ENDOSCOPIC: ICD-10-PCS | Performed by: UROLOGY

## 2024-11-29 PROCEDURE — 360N000075 HC SURGERY LEVEL 2, PER MIN: Performed by: UROLOGY

## 2024-11-29 PROCEDURE — 74420 UROGRAPHY RTRGR +-KUB: CPT | Mod: 26 | Performed by: UROLOGY

## 2024-11-29 PROCEDURE — 258N000003 HC RX IP 258 OP 636: Performed by: PHYSICIAN ASSISTANT

## 2024-11-29 PROCEDURE — 255N000002 HC RX 255 OP 636: Performed by: UROLOGY

## 2024-11-29 PROCEDURE — 87186 SC STD MICRODIL/AGAR DIL: CPT | Performed by: PHYSICIAN ASSISTANT

## 2024-11-29 PROCEDURE — 87149 DNA/RNA DIRECT PROBE: CPT | Performed by: PHYSICIAN ASSISTANT

## 2024-11-29 PROCEDURE — 85025 COMPLETE CBC W/AUTO DIFF WBC: CPT | Performed by: PHYSICIAN ASSISTANT

## 2024-11-29 PROCEDURE — 999N000182 XR SURGERY CARM FLUORO GREATER THAN 5 MIN: Mod: TC

## 2024-11-29 PROCEDURE — 96374 THER/PROPH/DIAG INJ IV PUSH: CPT

## 2024-11-29 PROCEDURE — 250N000013 HC RX MED GY IP 250 OP 250 PS 637

## 2024-11-29 PROCEDURE — 370N000017 HC ANESTHESIA TECHNICAL FEE, PER MIN: Performed by: UROLOGY

## 2024-11-29 PROCEDURE — 93005 ELECTROCARDIOGRAM TRACING: CPT | Performed by: PHYSICIAN ASSISTANT

## 2024-11-29 PROCEDURE — 81001 URINALYSIS AUTO W/SCOPE: CPT

## 2024-11-29 PROCEDURE — 52332 CYSTOSCOPY AND TREATMENT: CPT | Mod: LT | Performed by: UROLOGY

## 2024-11-29 PROCEDURE — 250N000011 HC RX IP 250 OP 636: Performed by: PHYSICIAN ASSISTANT

## 2024-11-29 PROCEDURE — 96375 TX/PRO/DX INJ NEW DRUG ADDON: CPT

## 2024-11-29 PROCEDURE — 74176 CT ABD & PELVIS W/O CONTRAST: CPT

## 2024-11-29 PROCEDURE — 36415 COLL VENOUS BLD VENIPUNCTURE: CPT | Performed by: PHYSICIAN ASSISTANT

## 2024-11-29 PROCEDURE — 710N000010 HC RECOVERY PHASE 1, LEVEL 2, PER MIN: Performed by: UROLOGY

## 2024-11-29 PROCEDURE — 120N000001 HC R&B MED SURG/OB

## 2024-11-29 PROCEDURE — 83605 ASSAY OF LACTIC ACID: CPT | Performed by: PHYSICIAN ASSISTANT

## 2024-11-29 PROCEDURE — C2617 STENT, NON-COR, TEM W/O DEL: HCPCS | Performed by: UROLOGY

## 2024-11-29 PROCEDURE — C1769 GUIDE WIRE: HCPCS | Performed by: UROLOGY

## 2024-11-29 PROCEDURE — 81003 URINALYSIS AUTO W/O SCOPE: CPT | Performed by: EMERGENCY MEDICINE

## 2024-11-29 PROCEDURE — 81025 URINE PREGNANCY TEST: CPT | Performed by: EMERGENCY MEDICINE

## 2024-11-29 PROCEDURE — 272N000001 HC OR GENERAL SUPPLY STERILE: Performed by: UROLOGY

## 2024-11-29 DEVICE — IMPLANTABLE DEVICE: Type: IMPLANTABLE DEVICE | Site: URETER | Status: FUNCTIONAL

## 2024-11-29 RX ORDER — NALOXONE HYDROCHLORIDE 0.4 MG/ML
0.2 INJECTION, SOLUTION INTRAMUSCULAR; INTRAVENOUS; SUBCUTANEOUS
Status: DISCONTINUED | OUTPATIENT
Start: 2024-11-29 | End: 2024-12-02 | Stop reason: HOSPADM

## 2024-11-29 RX ORDER — IBUPROFEN 200 MG
800 TABLET ORAL EVERY 8 HOURS PRN
COMMUNITY

## 2024-11-29 RX ORDER — ONDANSETRON 4 MG/1
4 TABLET, ORALLY DISINTEGRATING ORAL EVERY 30 MIN PRN
Status: DISCONTINUED | OUTPATIENT
Start: 2024-11-29 | End: 2024-11-29

## 2024-11-29 RX ORDER — CALCIUM CARBONATE 500 MG/1
1000 TABLET, CHEWABLE ORAL 4 TIMES DAILY PRN
Status: DISCONTINUED | OUTPATIENT
Start: 2024-11-29 | End: 2024-12-02 | Stop reason: HOSPADM

## 2024-11-29 RX ORDER — KETOROLAC TROMETHAMINE 15 MG/ML
15 INJECTION, SOLUTION INTRAMUSCULAR; INTRAVENOUS EVERY 6 HOURS PRN
Status: ACTIVE | OUTPATIENT
Start: 2024-11-29 | End: 2024-11-30

## 2024-11-29 RX ORDER — ONDANSETRON 2 MG/ML
4 INJECTION INTRAMUSCULAR; INTRAVENOUS ONCE
Status: COMPLETED | OUTPATIENT
Start: 2024-11-29 | End: 2024-11-29

## 2024-11-29 RX ORDER — ONDANSETRON 2 MG/ML
4 INJECTION INTRAMUSCULAR; INTRAVENOUS EVERY 30 MIN PRN
Status: DISCONTINUED | OUTPATIENT
Start: 2024-11-29 | End: 2024-11-29

## 2024-11-29 RX ORDER — ONDANSETRON 2 MG/ML
4 INJECTION INTRAMUSCULAR; INTRAVENOUS EVERY 30 MIN PRN
Status: DISCONTINUED | OUTPATIENT
Start: 2024-11-29 | End: 2024-11-29 | Stop reason: HOSPADM

## 2024-11-29 RX ORDER — SODIUM CHLORIDE, SODIUM LACTATE, POTASSIUM CHLORIDE, CALCIUM CHLORIDE 600; 310; 30; 20 MG/100ML; MG/100ML; MG/100ML; MG/100ML
INJECTION, SOLUTION INTRAVENOUS CONTINUOUS
Status: DISCONTINUED | OUTPATIENT
Start: 2024-11-29 | End: 2024-11-29 | Stop reason: HOSPADM

## 2024-11-29 RX ORDER — ONDANSETRON 4 MG/1
4 TABLET, ORALLY DISINTEGRATING ORAL EVERY 6 HOURS PRN
Status: DISCONTINUED | OUTPATIENT
Start: 2024-11-29 | End: 2024-12-02 | Stop reason: HOSPADM

## 2024-11-29 RX ORDER — DEXAMETHASONE SODIUM PHOSPHATE 4 MG/ML
4 INJECTION, SOLUTION INTRA-ARTICULAR; INTRALESIONAL; INTRAMUSCULAR; INTRAVENOUS; SOFT TISSUE
Status: DISCONTINUED | OUTPATIENT
Start: 2024-11-29 | End: 2024-11-29

## 2024-11-29 RX ORDER — CEFTRIAXONE 1 G/1
1 INJECTION, POWDER, FOR SOLUTION INTRAMUSCULAR; INTRAVENOUS ONCE
Status: COMPLETED | OUTPATIENT
Start: 2024-11-29 | End: 2024-11-29

## 2024-11-29 RX ORDER — METOPROLOL SUCCINATE 25 MG/1
50 TABLET, EXTENDED RELEASE ORAL AT BEDTIME
Status: DISCONTINUED | OUTPATIENT
Start: 2024-11-29 | End: 2024-12-01

## 2024-11-29 RX ORDER — ACETAMINOPHEN 325 MG/1
650 TABLET ORAL EVERY 4 HOURS PRN
Status: DISCONTINUED | OUTPATIENT
Start: 2024-11-29 | End: 2024-12-02 | Stop reason: HOSPADM

## 2024-11-29 RX ORDER — LIDOCAINE 40 MG/G
CREAM TOPICAL
Status: DISCONTINUED | OUTPATIENT
Start: 2024-11-29 | End: 2024-11-29 | Stop reason: HOSPADM

## 2024-11-29 RX ORDER — LIDOCAINE 40 MG/G
CREAM TOPICAL
Status: DISCONTINUED | OUTPATIENT
Start: 2024-11-29 | End: 2024-12-02 | Stop reason: HOSPADM

## 2024-11-29 RX ORDER — AMOXICILLIN 250 MG
2 CAPSULE ORAL 2 TIMES DAILY PRN
Status: DISCONTINUED | OUTPATIENT
Start: 2024-11-29 | End: 2024-12-02 | Stop reason: HOSPADM

## 2024-11-29 RX ORDER — AMOXICILLIN 250 MG
1 CAPSULE ORAL 2 TIMES DAILY PRN
Status: DISCONTINUED | OUTPATIENT
Start: 2024-11-29 | End: 2024-12-02 | Stop reason: HOSPADM

## 2024-11-29 RX ORDER — ONDANSETRON 2 MG/ML
4 INJECTION INTRAMUSCULAR; INTRAVENOUS EVERY 6 HOURS PRN
Status: DISCONTINUED | OUTPATIENT
Start: 2024-11-29 | End: 2024-12-02 | Stop reason: HOSPADM

## 2024-11-29 RX ORDER — KETOROLAC TROMETHAMINE 15 MG/ML
15 INJECTION, SOLUTION INTRAMUSCULAR; INTRAVENOUS ONCE
Status: COMPLETED | OUTPATIENT
Start: 2024-11-29 | End: 2024-11-29

## 2024-11-29 RX ORDER — ACETAMINOPHEN 650 MG/1
650 SUPPOSITORY RECTAL EVERY 4 HOURS PRN
Status: DISCONTINUED | OUTPATIENT
Start: 2024-11-29 | End: 2024-12-02 | Stop reason: HOSPADM

## 2024-11-29 RX ORDER — OXYCODONE HYDROCHLORIDE 5 MG/1
10 TABLET ORAL
Status: DISCONTINUED | OUTPATIENT
Start: 2024-11-29 | End: 2024-11-29 | Stop reason: HOSPADM

## 2024-11-29 RX ORDER — FENTANYL CITRATE 50 UG/ML
25 INJECTION, SOLUTION INTRAMUSCULAR; INTRAVENOUS EVERY 5 MIN PRN
Status: DISCONTINUED | OUTPATIENT
Start: 2024-11-29 | End: 2024-11-29

## 2024-11-29 RX ORDER — DEXAMETHASONE SODIUM PHOSPHATE 4 MG/ML
4 INJECTION, SOLUTION INTRA-ARTICULAR; INTRALESIONAL; INTRAMUSCULAR; INTRAVENOUS; SOFT TISSUE
Status: DISCONTINUED | OUTPATIENT
Start: 2024-11-29 | End: 2024-11-29 | Stop reason: HOSPADM

## 2024-11-29 RX ORDER — FENTANYL CITRATE 50 UG/ML
50 INJECTION, SOLUTION INTRAMUSCULAR; INTRAVENOUS EVERY 5 MIN PRN
Status: DISCONTINUED | OUTPATIENT
Start: 2024-11-29 | End: 2024-11-29

## 2024-11-29 RX ORDER — HYDROMORPHONE HCL IN WATER/PF 6 MG/30 ML
0.2 PATIENT CONTROLLED ANALGESIA SYRINGE INTRAVENOUS EVERY 5 MIN PRN
Status: DISCONTINUED | OUTPATIENT
Start: 2024-11-29 | End: 2024-11-29

## 2024-11-29 RX ORDER — OXYBUTYNIN CHLORIDE 5 MG/1
5 TABLET ORAL 3 TIMES DAILY PRN
Status: DISCONTINUED | OUTPATIENT
Start: 2024-11-29 | End: 2024-11-29 | Stop reason: HOSPADM

## 2024-11-29 RX ORDER — SODIUM CHLORIDE, SODIUM LACTATE, POTASSIUM CHLORIDE, CALCIUM CHLORIDE 600; 310; 30; 20 MG/100ML; MG/100ML; MG/100ML; MG/100ML
INJECTION, SOLUTION INTRAVENOUS CONTINUOUS
Status: DISCONTINUED | OUTPATIENT
Start: 2024-11-29 | End: 2024-11-29

## 2024-11-29 RX ORDER — PANTOPRAZOLE SODIUM 40 MG/1
40 TABLET, DELAYED RELEASE ORAL AT BEDTIME
Status: DISCONTINUED | OUTPATIENT
Start: 2024-11-29 | End: 2024-12-02 | Stop reason: HOSPADM

## 2024-11-29 RX ORDER — HYDROMORPHONE HYDROCHLORIDE 1 MG/ML
0.2 INJECTION, SOLUTION INTRAMUSCULAR; INTRAVENOUS; SUBCUTANEOUS
Status: DISCONTINUED | OUTPATIENT
Start: 2024-11-29 | End: 2024-12-02 | Stop reason: HOSPADM

## 2024-11-29 RX ORDER — NALOXONE HYDROCHLORIDE 0.4 MG/ML
0.1 INJECTION, SOLUTION INTRAMUSCULAR; INTRAVENOUS; SUBCUTANEOUS
Status: DISCONTINUED | OUTPATIENT
Start: 2024-11-29 | End: 2024-11-29 | Stop reason: HOSPADM

## 2024-11-29 RX ORDER — PROCHLORPERAZINE MALEATE 10 MG
10 TABLET ORAL EVERY 6 HOURS PRN
Status: DISCONTINUED | OUTPATIENT
Start: 2024-11-29 | End: 2024-12-02 | Stop reason: HOSPADM

## 2024-11-29 RX ORDER — VENLAFAXINE HYDROCHLORIDE 75 MG/1
225 CAPSULE, EXTENDED RELEASE ORAL AT BEDTIME
COMMUNITY

## 2024-11-29 RX ORDER — NALOXONE HYDROCHLORIDE 0.4 MG/ML
0.1 INJECTION, SOLUTION INTRAMUSCULAR; INTRAVENOUS; SUBCUTANEOUS
Status: DISCONTINUED | OUTPATIENT
Start: 2024-11-29 | End: 2024-11-29

## 2024-11-29 RX ORDER — LORAZEPAM 0.5 MG/1
0.5 TABLET ORAL EVERY 8 HOURS PRN
Status: DISCONTINUED | OUTPATIENT
Start: 2024-11-29 | End: 2024-12-02 | Stop reason: HOSPADM

## 2024-11-29 RX ORDER — POLYETHYLENE GLYCOL 3350 17 G/17G
17 POWDER, FOR SOLUTION ORAL 2 TIMES DAILY PRN
Status: DISCONTINUED | OUTPATIENT
Start: 2024-11-29 | End: 2024-12-02 | Stop reason: HOSPADM

## 2024-11-29 RX ORDER — NALOXONE HYDROCHLORIDE 0.4 MG/ML
0.4 INJECTION, SOLUTION INTRAMUSCULAR; INTRAVENOUS; SUBCUTANEOUS
Status: DISCONTINUED | OUTPATIENT
Start: 2024-11-29 | End: 2024-12-02 | Stop reason: HOSPADM

## 2024-11-29 RX ORDER — PHENAZOPYRIDINE HYDROCHLORIDE 100 MG/1
100 TABLET, FILM COATED ORAL 3 TIMES DAILY PRN
Status: DISCONTINUED | OUTPATIENT
Start: 2024-11-29 | End: 2024-12-02 | Stop reason: HOSPADM

## 2024-11-29 RX ORDER — HYDROMORPHONE HCL IN WATER/PF 6 MG/30 ML
0.4 PATIENT CONTROLLED ANALGESIA SYRINGE INTRAVENOUS EVERY 5 MIN PRN
Status: DISCONTINUED | OUTPATIENT
Start: 2024-11-29 | End: 2024-11-29

## 2024-11-29 RX ORDER — ONDANSETRON 4 MG/1
4 TABLET, ORALLY DISINTEGRATING ORAL EVERY 30 MIN PRN
Status: DISCONTINUED | OUTPATIENT
Start: 2024-11-29 | End: 2024-11-29 | Stop reason: HOSPADM

## 2024-11-29 RX ORDER — OXYCODONE HYDROCHLORIDE 5 MG/1
5 TABLET ORAL
Status: DISCONTINUED | OUTPATIENT
Start: 2024-11-29 | End: 2024-11-29 | Stop reason: HOSPADM

## 2024-11-29 RX ORDER — HYDROMORPHONE HYDROCHLORIDE 1 MG/ML
0.4 INJECTION, SOLUTION INTRAMUSCULAR; INTRAVENOUS; SUBCUTANEOUS
Status: DISCONTINUED | OUTPATIENT
Start: 2024-11-29 | End: 2024-12-02 | Stop reason: HOSPADM

## 2024-11-29 RX ORDER — LABETALOL HYDROCHLORIDE 5 MG/ML
10 INJECTION, SOLUTION INTRAVENOUS
Status: DISCONTINUED | OUTPATIENT
Start: 2024-11-29 | End: 2024-11-29

## 2024-11-29 RX ORDER — PHENAZOPYRIDINE HYDROCHLORIDE 100 MG/1
100 TABLET, FILM COATED ORAL 3 TIMES DAILY PRN
Status: DISCONTINUED | OUTPATIENT
Start: 2024-11-29 | End: 2024-11-29 | Stop reason: HOSPADM

## 2024-11-29 RX ADMIN — PANTOPRAZOLE SODIUM 40 MG: 40 TABLET, DELAYED RELEASE ORAL at 22:26

## 2024-11-29 RX ADMIN — PHENAZOPYRIDINE 100 MG: 100 TABLET ORAL at 22:26

## 2024-11-29 RX ADMIN — CEFTRIAXONE 1 G: 1 INJECTION, POWDER, FOR SOLUTION INTRAMUSCULAR; INTRAVENOUS at 17:18

## 2024-11-29 RX ADMIN — SODIUM CHLORIDE 1000 ML: 9 INJECTION, SOLUTION INTRAVENOUS at 17:19

## 2024-11-29 RX ADMIN — VENLAFAXINE HYDROCHLORIDE 225 MG: 75 CAPSULE, EXTENDED RELEASE ORAL at 22:26

## 2024-11-29 RX ADMIN — ONDANSETRON 4 MG: 2 INJECTION, SOLUTION INTRAMUSCULAR; INTRAVENOUS at 17:18

## 2024-11-29 RX ADMIN — KETOROLAC TROMETHAMINE 15 MG: 15 INJECTION, SOLUTION INTRAMUSCULAR; INTRAVENOUS at 17:18

## 2024-11-29 ASSESSMENT — COLUMBIA-SUICIDE SEVERITY RATING SCALE - C-SSRS
2. HAVE YOU ACTUALLY HAD ANY THOUGHTS OF KILLING YOURSELF IN THE PAST MONTH?: NO
6. HAVE YOU EVER DONE ANYTHING, STARTED TO DO ANYTHING, OR PREPARED TO DO ANYTHING TO END YOUR LIFE?: NO
1. IN THE PAST MONTH, HAVE YOU WISHED YOU WERE DEAD OR WISHED YOU COULD GO TO SLEEP AND NOT WAKE UP?: NO

## 2024-11-29 ASSESSMENT — ACTIVITIES OF DAILY LIVING (ADL)
ADLS_ACUITY_SCORE: 43
ADLS_ACUITY_SCORE: 44
ADLS_ACUITY_SCORE: 43
ADLS_ACUITY_SCORE: 18
ADLS_ACUITY_SCORE: 43

## 2024-11-29 NOTE — ED PROVIDER NOTES
"Emergency Department Midlevel Supervisory Note     I had a face to face encounter with this patient seen by the Advanced Practice Provider (ADELA). I personally made/approved the management plan and take responsibility for the patient management. I personally saw patient and performed a substantive portion of the visit including all aspects of the medical decision making.     ED Course:  5:12 PM Nadira Tejada PA-C staffed patient with me. I agree with their assessment and plan of management, and I will see the patient.  5:16 PM I met with the patient to introduce myself, gather additional history, perform my initial exam, and discuss the plan.     Brief HPI:     Divya Kasper is a 49 year old female who presents for evaluation of UTI symptoms and feeling generally unwell.     Reports urinary frequency, urgency, and burning. Also malodorous urine. This has been going on for about 2 weeks. Reports a long history of frequent UTIs, but states she has never had a kidney infection before.     About 2-3 days ago she started having nausea and vomiting. Was having diarrhea, but that has resolved. Has felt hot and cold and has had sweats. Has checked her temp a few times and no fever detected. Denies cough, runny nose, sore throat.        I, Shaka Balderas, am serving as a scribe to document services personally performed by Jacob Winchester MD, based on my observations and the provider's statements to me.   I, Jacob Winchester MD attest that Shaka Balderas was acting in a scribe capacity, has observed my performance of the services and has documented them in accordance with my direction.    Brief Physical Exam: /68   Pulse 98   Temp 98.7  F (37.1  C) (Oral)   Resp 16   Ht 1.626 m (5' 4\")   Wt 66.2 kg (146 lb)   SpO2 100%   BMI 25.06 kg/m    Constitutional:  Alert, in no acute distress  EYES: Conjunctivae clear  HENT:  Atraumatic  Respiratory:  Respirations even, unlabored, in no acute respiratory " distress  Cardiovascular:  Regular rate and rhythm, good peripheral perfusion  GI: Soft, non-distended, LLQ and LUQ tenderness without rebound or guarding.   Musculoskeletal:  Left CVA tenderness. Moves all 4 extremities equally, grossly symmetrical strength  Integument: Warm & dry. No appreciable rash, erythema.  Neurologic:  Alert & oriented, speech clear and fluent, no focal deficits noted  Psych: Normal mood and affect       MDM:  Patient presenting with urinary frequency, urgency and burning as well as left-sided flank pain.  Was seen at outside urgent care and referred to the emergency department.  She is tachycardic, temperature is 99.7 otherwise hemodynamically stable.  UA shows sign of infection as well as blood.  Ordered CT scan of the abdomen without contrast to evaluate for obstructive stone.  CT scan of the abdomen showed nonobstructive stone with mild hydronephrosis.  Concern for septic stone.  Give ceftriaxone, urine culture and blood culture.  PA consulted [facility] who recommends removal of the stone and admission.       1. Nephrolithiasis    2. Urinary tract infection symptoms    3. Ureteral stone with hydronephrosis    4. UTI (urinary tract infection)    5. CLAY (acute kidney injury) (H)    6. Hyponatremia          Labs and Imaging:  Results for orders placed or performed during the hospital encounter of 11/29/24   CT Abdomen Pelvis w/o Contrast    Impression    IMPRESSION:   1.  4 mm stone within the lower middle left ureter just proximal to the iliac crossing with minimal left-sided hydronephrosis. Tiny punctate nonobstructing stone within the right kidney.     UA with Microscopic reflex to Culture    Specimen: Urine, Midstream   Result Value Ref Range    Color Urine Yellow Colorless, Straw, Light Yellow, Yellow    Appearance Urine Turbid (A) Clear    Glucose Urine Negative Negative mg/dL    Bilirubin Urine Negative Negative    Ketones Urine 40 (A) Negative mg/dL    Specific Gravity Urine 1.022 1.001 -  1.030    Blood Urine 1.0 mg/dL (A) Negative    pH Urine 6.0 5.0 - 7.0    Protein Albumin Urine 200 (A) Negative mg/dL    Urobilinogen Urine 4.0 (A) <2.0 mg/dL    Nitrite Urine Negative Negative    Leukocyte Esterase Urine 250 Doreen/uL (A) Negative    Bacteria Urine Few (A) None Seen /HPF    Mucus Urine Present (A) None Seen /LPF    RBC Urine 155 (H) <=2 /HPF    WBC Urine 73 (H) <=5 /HPF    Squamous Epithelials Urine 17 (H) <=1 /HPF    Transitional Epithelials Urine <1 <=1 /HPF   Lactic Acid Whole Blood with 1X Repeat in 2 HR when >2   Result Value Ref Range    Lactic Acid, Initial 2.0 0.7 - 2.0 mmol/L   Basic metabolic panel   Result Value Ref Range    Sodium 131 (L) 135 - 145 mmol/L    Potassium 4.2 3.4 - 5.3 mmol/L    Chloride 96 (L) 98 - 107 mmol/L    Carbon Dioxide (CO2) 21 (L) 22 - 29 mmol/L    Anion Gap 14 7 - 15 mmol/L    Urea Nitrogen 12.4 6.0 - 20.0 mg/dL    Creatinine 1.10 (H) 0.51 - 0.95 mg/dL    GFR Estimate 62 >60 mL/min/1.73m2    Calcium 8.4 (L) 8.8 - 10.4 mg/dL    Glucose 121 (H) 70 - 99 mg/dL   CBC with platelets and differential   Result Value Ref Range    WBC Count 9.2 4.0 - 11.0 10e3/uL    RBC Count 4.03 3.80 - 5.20 10e6/uL    Hemoglobin 12.4 11.7 - 15.7 g/dL    Hematocrit 35.7 35.0 - 47.0 %    MCV 89 78 - 100 fL    MCH 30.8 26.5 - 33.0 pg    MCHC 34.7 31.5 - 36.5 g/dL    RDW 12.7 10.0 - 15.0 %    Platelet Count 167 150 - 450 10e3/uL    % Neutrophils 87 %    % Lymphocytes 7 %    % Monocytes 5 %    % Eosinophils 1 %    % Basophils 0 %    % Immature Granulocytes 1 %    NRBCs per 100 WBC 0 <1 /100    Absolute Neutrophils 8.0 1.6 - 8.3 10e3/uL    Absolute Lymphocytes 0.6 (L) 0.8 - 5.3 10e3/uL    Absolute Monocytes 0.5 0.0 - 1.3 10e3/uL    Absolute Eosinophils 0.1 0.0 - 0.7 10e3/uL    Absolute Basophils 0.0 0.0 - 0.2 10e3/uL    Absolute Immature Granulocytes 0.1 <=0.4 10e3/uL    Absolute NRBCs 0.0 10e3/uL   ECG 12-Lead with MUSE - [facility]   Result Value Ref Range    Systolic Blood Pressure 122 mmHg     Diastolic Blood Pressure 75 mmHg    Ventricular Rate 106 BPM    Atrial Rate 106 BPM    WY Interval 166 ms    QRS Duration 82 ms     ms    QTc 441 ms    P Axis 38 degrees    R AXIS 87 degrees    T Axis -31 degrees    Interpretation ECG       Sinus tachycardia  T wave abnormality, consider inferior ischemia  Abnormal ECG  No previous ECGs available  Confirmed by SEE ED PROVIDER NOTE FOR, ECG INTERPRETATION ([id number]),  JERZY ROSS ([id number]) on 11/29/2024 5:44:50 PM         I have reviewed the relevant laboratory studies above.    I independently interpreted the following imaging study(s):   CT scan of the abdomen and pelvis does not reveal large AAA      Procedures:  I was present for the key portions of procedures documented in ADELA/midlevel note, see midlevel note for further details.    Jacob Winchester MD  [facility] EMERGENCY ROOM  [address] [address] [phone]     Jacob Winchester MD  11/29/24 1009

## 2024-11-29 NOTE — ED TRIAGE NOTES
Pt sent from clinic due to dysuria, fever, back pain, nausea and vomiting x 2 days.      Triage Assessment (Adult)       Row Name 11/29/24 [id number]          Triage Assessment    Airway WDL WDL        Respiratory WDL    Respiratory WDL WDL        Skin Circulation/Temperature WDL    Skin Circulation/Temperature WDL WDL        Cardiac WDL    Cardiac WDL WDL        Peripheral/Neurovascular WDL    Peripheral Neurovascular WDL WDL        Cognitive/Neuro/Behavioral WDL    Cognitive/Neuro/Behavioral WDL WDL

## 2024-11-29 NOTE — ED PROVIDER NOTES
EMERGENCY DEPARTMENT ENCOUNTER   NAME: Divya Kasper ; AGE: 49 year old female ; YOB: 1975 ; MRN: [id number] ; PCP: Leslee, Dwayne Trejo     Evaluation Date & Time: 11/29/2024  4:43 PM    ED Provider: Nadira Tejada PA-C    CHIEF COMPLAINT     Dysuria, Fever, and Back Pain      FINAL ASSESSMENT       ICD-10-CM    1. Nephrolithiasis  N20.0 Case Request: CYSTOSCOPY, WITH RETROGRADE PYELOGRAM AND URETERAL STENT REPLACEMENT     Case Request: CYSTOSCOPY, WITH RETROGRADE PYELOGRAM AND URETERAL STENT REPLACEMENT      2. Urinary tract infection symptoms  R39.9 Case Request: CYSTOSCOPY, WITH RETROGRADE PYELOGRAM AND URETERAL STENT REPLACEMENT     Case Request: CYSTOSCOPY, WITH RETROGRADE PYELOGRAM AND URETERAL STENT REPLACEMENT      3. Ureteral stone with hydronephrosis  N13.2       4. UTI (urinary tract infection)  N39.0       5. CLAY (acute kidney injury) (H)  N17.9       6. Hyponatremia  E87.1           ED COURSE, MEDICAL DECISION MAKING, PLAN     ED course/notable events     5:01 PM Evaluated patient.   5:25 PM Staffed with Dr. Winchester.   6:13 PM Updated patient. Call out to [facility].   6:16 PM Spoke with Dr. Kendrick from [facility]. She is going to look into OR availability. Call out to admitting.   6:46 PM Spoke with resident hospitalist who accepts patient for admission.   ______________________________________________________________________    Reason for visit: Divya Kasper is a 49 year old female with anemia, anxiety presenting for 2 weeks of UTI like symptoms and 2-3 days of nausea, vomiting, shaking chills and sweats. Seen at  today and urine grossly infected and HR quite elevated so sent here.     Exam positives: Tearful and anxious. Mild left sided CVA tenderness. Exam otherwise benign.     Vitals:  otherwise vitally normal.     Labs: UA grossly infected with large amount of WBC and RBC. Culture pending.  Creatinine elevated to 1.10 with GFR of 62.  2 years ago was normal.  Sodium low at 131.  Lactic 2.0. No leukocytosis.      EKG:  EKG was collected and independently interpreted by myself and also confirmed by MD.  Findings: Sinus tachycardia with a rate of 106.  QT/QTc 332/441.  No emergent ischemia or STEMI.  Comparisons: None    Imaging: CT abdomen and pelvis obtained. Radiologist read: 4 mm stone within the lower middle left ureter just proximal to the iliac crossing with minimal left-sided hydronephrosis. Tiny punctate nonobstructing stone within the right kidney.     Interventions/recheck: Patient was given 1 L of normal saline, 4 mg of Zofran, 15 mg of Toradol.  She was also started on 1 g of Rocephin.  On recheck she is feeling much improved.     Consults: Dr. Kendrick from [facility]. Plans for OR tonight.     Assessment: Infected ureteral stone on the left side with mild CLAY and hyponatremia.    Plan: Admission with plans for [facility] to take patient to the OR tonight.       -------------------------------------------------------------------------------------------------------------  *All pertinent lab & imaging studies independently reviewed. (See chart for details)   *Discussed the results of all the tests and plan with patient and family/guardians.     HISTORY OF PRESENT ILLNESS   Patient information was obtained from: Patient   Use of Intrepreter: None     Pertinent past medical history: anemia, anxiety    Divya Kasper is here by means of walk in  with self for evaluation of UTI symptoms and feeling generally unwell.    Reports urinary frequency, urgency, and burning. Also malodorous urine. This has been going on for about 2 weeks.     About 2-3 days ago she started having nausea and vomiting  Was having diarrhea, but that has resolved.   Has felt hot and cold and has had sweats. Has checked her temp a few times and no fever detected.     Denies cough, runny nose, sore throat.     Reports a long history of frequent UTIs, but states she has never had a kidney infection before.     No other concerns.  "    Per my chart review  Today: [facility] for these symptoms. Was referred here for concern of kidney infection/sepsis.   10/10/2024: Last urine culture. Had no growth. No other cultures to review.     MEDICAL HISTORY     No past medical history on file.    No past surgical history on file.    No family history on file.    Social History     Tobacco Use    Smoking status: Never    Smokeless tobacco: Never   Vaping Use    Vaping status: Never Used       Medications   ibuprofen (ADVIL/MOTRIN) 200 MG tablet  LORazepam (ATIVAN) 0.5 MG tablet  metoprolol succinate ER (TOPROL-XL) 50 MG 24 hr tablet  omeprazole (PRILOSEC) 20 MG DR capsule  venlafaxine (EFFEXOR XR) 75 MG 24 hr capsule          PHYSICAL EXAM     First Vitals:  Patient Vitals for the past 24 hrs:   BP Temp Temp src Pulse Resp SpO2 Height Weight   11/29/24 1824 -- 98.7  F (37.1  C) Oral -- -- -- -- --   11/29/24 1800 122/68 -- -- 98 16 100 % -- --   11/29/24 1700 122/75 -- -- 113 -- 100 % -- --   11/29/24 1647 -- -- -- (!) 122 -- -- -- --   11/29/24 1646 117/80 99.7  F (37.6  C) Oral (!) 127 18 98 % 1.626 m (5' 4\") 66.2 kg (146 lb)       PHYSICAL EXAM:   Constitutional: Anxious and tearful.   Neuro: Awake and alert. No focal deficits.  Psych: Cooperative.    Mouth: Pink and moist.  Cardio: . Adequate perfusion to extremities. Regular rhythm. No murmurs.  Pulmonary: Oxygenating well on RA. No labored breathing. No wheezes. No rales. No rhonchi.   Abdomen: BS present. Soft. Non-distended. No rigidity. No palpable pain. No rebound. No guarding.   Back: Mild left sided CVA tenderness. Appears to move freely.     Skin: Natural color, warm, dry, intact.         RESULTS     LAB:  All pertinent labs reviewed and interpreted  Labs Ordered and Resulted from Time of ED Arrival to Time of ED Departure   ROUTINE UA WITH MICROSCOPIC REFLEX TO CULTURE - Abnormal       Result Value    Color Urine Yellow      Appearance Urine Turbid (*)     Glucose Urine Negative   "    Bilirubin Urine Negative      Ketones Urine 40 (*)     Specific Gravity Urine 1.022      Blood Urine 1.0 mg/dL (*)     pH Urine 6.0      Protein Albumin Urine 200 (*)     Urobilinogen Urine 4.0 (*)     Nitrite Urine Negative      Leukocyte Esterase Urine 250 Doreen/uL (*)     Bacteria Urine Few (*)     Mucus Urine Present (*)     RBC Urine 155 (*)     WBC Urine 73 (*)     Squamous Epithelials Urine 17 (*)     Transitional Epithelials Urine <1     BASIC METABOLIC PANEL - Abnormal    Sodium 131 (*)     Potassium 4.2      Chloride 96 (*)     Carbon Dioxide (CO2) 21 (*)     Anion Gap 14      Urea Nitrogen 12.4      Creatinine 1.10 (*)     GFR Estimate 62      Calcium 8.4 (*)     Glucose 121 (*)    CBC WITH PLATELETS AND DIFFERENTIAL - Abnormal    WBC Count 9.2      RBC Count 4.03      Hemoglobin 12.4      Hematocrit 35.7      MCV 89      MCH 30.8      MCHC 34.7      RDW 12.7      Platelet Count 167      % Neutrophils 87      % Lymphocytes 7      % Monocytes 5      % Eosinophils 1      % Basophils 0      % Immature Granulocytes 1      NRBCs per 100 WBC 0      Absolute Neutrophils 8.0      Absolute Lymphocytes 0.6 (*)     Absolute Monocytes 0.5      Absolute Eosinophils 0.1      Absolute Basophils 0.0      Absolute Immature Granulocytes 0.1      Absolute NRBCs 0.0     LACTIC ACID WHOLE BLOOD WITH 1X REPEAT IN 2 HR WHEN >2 - Normal    Lactic Acid, Initial 2.0     URINE CULTURE   BLOOD CULTURE       RADIOLOGY:  CT Abdomen Pelvis w/o Contrast   Final Result   IMPRESSION:    1.  4 mm stone within the lower middle left ureter just proximal to the iliac crossing with minimal left-sided hydronephrosis. Tiny punctate nonobstructing stone within the right kidney.             PROCEDURES     None           FINAL IMPRESSION:    ICD-10-CM    1. Nephrolithiasis  N20.0 Case Request: CYSTOSCOPY, WITH RETROGRADE PYELOGRAM AND URETERAL STENT REPLACEMENT     Case Request: CYSTOSCOPY, WITH RETROGRADE PYELOGRAM AND URETERAL STENT REPLACEMENT       2. Urinary tract infection symptoms  R39.9 Case Request: CYSTOSCOPY, WITH RETROGRADE PYELOGRAM AND URETERAL STENT REPLACEMENT     Case Request: CYSTOSCOPY, WITH RETROGRADE PYELOGRAM AND URETERAL STENT REPLACEMENT      3. Ureteral stone with hydronephrosis  N13.2       4. UTI (urinary tract infection)  N39.0       5. CLAY (acute kidney injury) (H)  N17.9       6. Hyponatremia  E87.1           MEDICATIONS GIVEN IN THE EMERGENCY DEPARTMENT:  Medications   sodium chloride 0.9% BOLUS 1,000 mL (0 mLs Intravenous Stopped 11/29/24 1823)   ondansetron (ZOFRAN) injection 4 mg (4 mg Intravenous $Given 11/29/24 1718)   ketorolac (TORADOL) injection 15 mg (15 mg Intravenous $Given 11/29/24 1718)   cefTRIAXone (ROCEPHIN) 1 g vial to attach to  mL bag for ADULTS or NS 50 mL bag for PEDS (0 g Intravenous Stopped 11/29/24 1733)       NEW PRESCRIPTIONS STARTED AT TODAY'S ED VISIT:  New Prescriptions    No medications on file            MEDICAL DECISION MAKING:  Medical Decision Making  Obtained supplemental history:Supplemental history obtained?: No  Reviewed external records: External records reviewed?: Outpatient Record: See HPI and Prior Labs: See HPI  Care impacted by chronic illness:Documented in Chart  Did you consider but not order tests?: Work up considered but not performed and documented in chart, if applicable  Did you interpret images independently?: Independent interpretation of ECG and images noted in documentation, when applicable.  Consultation discussion with other provider:Did you involve another provider (consultant, MH, pharmacy, etc.)?: I discussed the care with another health care provider, see documentation for details.  Admit.    MIPS: Not Applicable      CRITICAL CARE:  N/A             Some or all of this documentation has been completed using dictation software and mild grammatical errors may be present. Please contact me with any concerns regarding this.     Nadira Tejada PA-C  Emergency  [facility] EMERGENCY ROOM       Nadira Tejada PA-C  11/29/24 3401

## 2024-11-30 LAB
ACINETOBACTER SPECIES: NOT DETECTED
ALBUMIN SERPL BCG-MCNC: 3.5 G/DL (ref 3.5–5.2)
ALP SERPL-CCNC: 110 U/L (ref 40–150)
ALT SERPL W P-5'-P-CCNC: 49 U/L (ref 0–50)
ANION GAP SERPL CALCULATED.3IONS-SCNC: 12 MMOL/L (ref 7–15)
AST SERPL W P-5'-P-CCNC: 30 U/L (ref 0–45)
BACTERIA UR CULT: ABNORMAL
BILIRUB SERPL-MCNC: 0.3 MG/DL
BUN SERPL-MCNC: 12.3 MG/DL (ref 6–20)
CALCIUM SERPL-MCNC: 8.4 MG/DL (ref 8.8–10.4)
CHLORIDE SERPL-SCNC: 106 MMOL/L (ref 98–107)
CITROBACTER SPECIES: NOT DETECTED
CREAT SERPL-MCNC: 1.02 MG/DL (ref 0.51–0.95)
CTX-M: NOT DETECTED
EGFRCR SERPLBLD CKD-EPI 2021: 68 ML/MIN/1.73M2
ENTEROBACTER SPECIES: NOT DETECTED
ERYTHROCYTE [DISTWIDTH] IN BLOOD BY AUTOMATED COUNT: 12.9 % (ref 10–15)
ESCHERICHIA COLI: DETECTED
GLUCOSE SERPL-MCNC: 125 MG/DL (ref 70–99)
HCO3 SERPL-SCNC: 20 MMOL/L (ref 22–29)
HCT VFR BLD AUTO: 33.5 % (ref 35–47)
HGB BLD-MCNC: 11.5 G/DL (ref 11.7–15.7)
IMP: NOT DETECTED
KLEBSIELLA OXYTOCA: NOT DETECTED
KLEBSIELLA PNEUMONIAE: NOT DETECTED
KPC: NOT DETECTED
MCH RBC QN AUTO: 30.7 PG (ref 26.5–33)
MCHC RBC AUTO-ENTMCNC: 34.3 G/DL (ref 31.5–36.5)
MCV RBC AUTO: 89 FL (ref 78–100)
NDM: NOT DETECTED
OXA (DETECTED/NOT DETECTED): NOT DETECTED
PLATELET # BLD AUTO: 177 10E3/UL (ref 150–450)
POTASSIUM SERPL-SCNC: 3.6 MMOL/L (ref 3.4–5.3)
PROT SERPL-MCNC: 6.7 G/DL (ref 6.4–8.3)
PROTEUS SPECIES: NOT DETECTED
PSEUDOMONAS AERUGINOSA: NOT DETECTED
RBC # BLD AUTO: 3.75 10E6/UL (ref 3.8–5.2)
SODIUM SERPL-SCNC: 138 MMOL/L (ref 135–145)
VIM: NOT DETECTED
WBC # BLD AUTO: 7.1 10E3/UL (ref 4–11)

## 2024-11-30 PROCEDURE — 85018 HEMOGLOBIN: CPT

## 2024-11-30 PROCEDURE — 120N000001 HC R&B MED SURG/OB

## 2024-11-30 PROCEDURE — 250N000013 HC RX MED GY IP 250 OP 250 PS 637

## 2024-11-30 PROCEDURE — 99223 1ST HOSP IP/OBS HIGH 75: CPT | Mod: AI

## 2024-11-30 PROCEDURE — 80053 COMPREHEN METABOLIC PANEL: CPT

## 2024-11-30 PROCEDURE — 36415 COLL VENOUS BLD VENIPUNCTURE: CPT

## 2024-11-30 PROCEDURE — 87040 BLOOD CULTURE FOR BACTERIA: CPT

## 2024-11-30 PROCEDURE — 250N000011 HC RX IP 250 OP 636

## 2024-11-30 RX ORDER — OXYBUTYNIN CHLORIDE 5 MG/1
5 TABLET, EXTENDED RELEASE ORAL DAILY PRN
Status: DISCONTINUED | OUTPATIENT
Start: 2024-11-30 | End: 2024-12-02 | Stop reason: HOSPADM

## 2024-11-30 RX ORDER — TAMSULOSIN HYDROCHLORIDE 0.4 MG/1
0.4 CAPSULE ORAL DAILY PRN
Status: DISCONTINUED | OUTPATIENT
Start: 2024-11-30 | End: 2024-12-02 | Stop reason: HOSPADM

## 2024-11-30 RX ORDER — CEFTRIAXONE 1 G/1
1 INJECTION, POWDER, FOR SOLUTION INTRAMUSCULAR; INTRAVENOUS EVERY 24 HOURS
Status: DISCONTINUED | OUTPATIENT
Start: 2024-11-30 | End: 2024-12-02 | Stop reason: HOSPADM

## 2024-11-30 RX ADMIN — ACETAMINOPHEN 650 MG: 325 TABLET ORAL at 23:55

## 2024-11-30 RX ADMIN — ACETAMINOPHEN 650 MG: 325 TABLET ORAL at 04:13

## 2024-11-30 RX ADMIN — PANTOPRAZOLE SODIUM 40 MG: 40 TABLET, DELAYED RELEASE ORAL at 19:53

## 2024-11-30 RX ADMIN — LORAZEPAM 0.5 MG: 0.5 TABLET ORAL at 19:57

## 2024-11-30 RX ADMIN — VENLAFAXINE HYDROCHLORIDE 225 MG: 75 CAPSULE, EXTENDED RELEASE ORAL at 19:53

## 2024-11-30 RX ADMIN — LORAZEPAM 0.5 MG: 0.5 TABLET ORAL at 04:13

## 2024-11-30 RX ADMIN — ACETAMINOPHEN 650 MG: 325 TABLET ORAL at 12:18

## 2024-11-30 RX ADMIN — ACETAMINOPHEN 650 MG: 325 TABLET ORAL at 16:35

## 2024-11-30 RX ADMIN — CEFTRIAXONE 1 G: 1 INJECTION, POWDER, FOR SOLUTION INTRAMUSCULAR; INTRAVENOUS at 16:35

## 2024-11-30 ASSESSMENT — ACTIVITIES OF DAILY LIVING (ADL)
ADLS_ACUITY_SCORE: 18
ADLS_ACUITY_SCORE: 19
ADLS_ACUITY_SCORE: 17
ADLS_ACUITY_SCORE: 17
ADLS_ACUITY_SCORE: 18
ADLS_ACUITY_SCORE: 17
ADLS_ACUITY_SCORE: 27
ADLS_ACUITY_SCORE: 27
ADLS_ACUITY_SCORE: 18
ADLS_ACUITY_SCORE: 19
ADLS_ACUITY_SCORE: 19
ADLS_ACUITY_SCORE: 18
ADLS_ACUITY_SCORE: 19
ADLS_ACUITY_SCORE: 18
ADLS_ACUITY_SCORE: 17
ADLS_ACUITY_SCORE: 23
ADLS_ACUITY_SCORE: 18
ADLS_ACUITY_SCORE: 23
ADLS_ACUITY_SCORE: 19
ADLS_ACUITY_SCORE: 17
ADLS_ACUITY_SCORE: 27
ADLS_ACUITY_SCORE: 27
ADLS_ACUITY_SCORE: 18

## 2024-11-30 NOTE — PROGRESS NOTES
[facility]    Progress Note - Hospitalist Service       Date of Admission:  11/29/2024    Assessment & Plan   Divya Kasper is a 49 year old female admitted on 11/29/2024. She has a history of anxiety, depression, and hypertension and is admitted for UTI complicated by non-obstructive stone concerning for septic stone and mild hydronephrosis.     Concern for septic stone  Nonobstructive stone with mild hydronephrosis  Complicated UTI   Presented following 2 weeks of UTI symptoms, 2-3 d history of nausea, vomiting, chills, and diaphoresis. UA in the ED suspicious for infection and CT abdomen/pelvis found 4 mm stone in L middle/lower ureter and left sided hydronephrosis as well as non-obstructing stone in the right kidney. Cystoscopy and left ureteral stent placement completed 11/29/24. Urine culture positive for E. Coli, sensitivities pending.     - IV Ceftriaxone 1 g q24h in the setting of UTI and E. Coli bacteremia  - Pain control: Tylenol, Toradol, IV dilaudid for breakthrough pain  - Antiemetics: Zofran, compazine  - Urology consulted, recommend keeping catheter in for 24 hours following procedure.   - Pyrimidine for urinary discomfort, also recommend PRN oxybutynin or flomax for catheter associated discomfort   - PVR after catheter removal with goal of less than 150 mL residual       E. Coli bacteremia  Blood cultures drawn on 11/29 positive on first day of incubation with E. Coli identified on verigene. Patient has been hemodynamically stable following cystoscopy and ureteral stent placement.     - AM Blood cultures   - AM CBC  - IV Ceftriaxone 1 g q24h  - Consider narrowing antibiotic choice based on sensitivities    Hyponatremia, resolved  Na 131 at admission, repeat BMP with Na within normal limits.    - AM BMP    Chronic Conditions:   - HTN: PTA metoprolol held, consider starting if BP elevated  - Anxiety and Depression: PTA Effexor, Ativan          Diet: Regular Diet Adult     DVT Prophylaxis: Pneumatic Compression Devices  Sam Catheter: PRESENT, indication: Surgical procedure  Fluids: PO  Lines: None     Cardiac Monitoring: None  Code Status: Full Code      Clinically Significant Risk Factors Present on Admission         # Hyponatremia: Lowest Na = 131 mmol/L in last 2 days, will monitor as appropriate  # Hypochloremia: Lowest Cl = 96 mmol/L in last 2 days, will monitor as appropriate  # Hypocalcemia: Lowest Ca = 8.4 mg/dL in last 2 days, will monitor and replace as appropriate                              Social Drivers of Health          Disposition Plan     Medically Ready for Discharge: Anticipated in 2-4 Days         The patient's care was discussed with the Attending Physician, Dr. Nadira Valle .    Kamilah Marroquin MD  Hospitalist Service  [facility]  Securely message with [employer] info)  Text page via [employer] Paging/Directory   ______________________________________________________________________    Interval History   Divya does not endorse any post-operative pain, but states that most of her discomfort stems from having the catheter in place. She states that it is hard to focus on anything else as the catheter makes her feel as though she needs to constantly urinate. She has been tolerating fluids well and does not have any other concerns.     Physical Exam   Vital Signs: Temp: 97.7  F (36.5  C) Temp src: Oral BP: 121/79 Pulse: 104   Resp: 18 SpO2: 98 % O2 Device: None (Room air) Oxygen Delivery: 2 LPM  Weight: 140 lbs 12.8 oz    Physical Exam  Constitutional:       General: She is not in acute distress.  Cardiovascular:      Rate and Rhythm: Normal rate and regular rhythm.      Pulses: Normal pulses.      Heart sounds: Normal heart sounds.   Pulmonary:      Effort: Pulmonary effort is normal. No respiratory distress.      Breath sounds: Normal breath sounds.   Abdominal:      General: Abdomen is flat.      Palpations: Abdomen is soft.       Tenderness: There is abdominal tenderness. There is no right CVA tenderness, left CVA tenderness, guarding or rebound.   Skin:     General: Skin is warm and dry.      Capillary Refill: Capillary refill takes less than 2 seconds.   Neurological:      Mental Status: She is alert.   Psychiatric:         Mood and Affect: Mood normal.         Data     I have personally reviewed the following data over the past 24 hrs:    7.1  \   11.5 (L)   / 177     138 106 12.3 /  125 (H)   3.6 20 (L) 1.02 (H) \     ALT: 49 AST: 30 AP: 110 TBILI: 0.3   ALB: 3.5 TOT PROTEIN: 6.7 LIPASE: N/A     Procal: N/A CRP: N/A Lactic Acid: 2.0         Imaging results reviewed over the past 24 hrs:   Recent Results (from the past 24 hours)   CT Abdomen Pelvis w/o Contrast    Narrative    EXAM: CT ABDOMEN PELVIS W/O CONTRAST  LOCATION: [facility]  DATE: 11/29/2024    INDICATION: Pyelonephritis. Rule out stone.  COMPARISON: None.  TECHNIQUE: CT scan of the abdomen and pelvis was performed without IV contrast. Multiplanar reformats were obtained. Dose reduction techniques were used.  CONTRAST: None.    FINDINGS:   LOWER CHEST: Normal.    HEPATOBILIARY: Cholecystectomy.    PANCREAS: Normal.    SPLEEN: Normal.    ADRENAL GLANDS: Normal.    KIDNEYS/BLADDER: Or millimeters stone in left ureter just proximal to the iliac crossing. Tiny punctate nonobstructing stone in the right kidney.    BOWEL: Normal.    LYMPH NODES: Normal.    VASCULATURE: Normal.    PELVIC ORGANS: Normal.    MUSCULOSKELETAL: Normal.      Impression    IMPRESSION:   1.  4 mm stone within the lower middle left ureter just proximal to the iliac crossing with minimal left-sided hydronephrosis. Tiny punctate nonobstructing stone within the right kidney.     XR Surgery EPI  Fluoro G/T 5 Min    Narrative    This exam was marked as non-reportable because it will not be read by a   radiologist or a [place] non-radiologist provider.

## 2024-11-30 NOTE — PLAN OF CARE
Problem: Adult Inpatient Plan of Care  Goal: Optimal Comfort and Wellbeing  Outcome: Progressing     Problem: Infection  Goal: Absence of Infection Signs and Symptoms  Outcome: Progressing   Goal Outcome Evaluation:       2300 -0730  Pt is alert and oriented, calm, cooperative with flat affect. Denies pain, only abdominal discomfort d/t urethral catheter per pt. Sam draining orange color urine d/t Pyridium. Ambulated in the room independently. Tachy on the low 100s to 127 bpm, rest of VSS on RA. Given Lorazepam for anxiety.

## 2024-11-30 NOTE — PLAN OF CARE
Problem: Adult Inpatient Plan of Care  Goal: Optimal Comfort and Wellbeing  Outcome: Progressing    Goal Outcome Evaluation:    Patient is awake and alert and oriented to time, place and person.    Blood & urine cultures positive for E.Coli. IV ABX initiated.    B/P: 121/79, T: 97.7, P: 104, R: 18. Tachycardic at times, but less as the shift advanced.    Tele SR with a prolonged QTc.    RA to maintain saturations > 92 %.    C/O 3-6/10 pain. PRN's administered Tylenol. Patient aware Oxybutynin & Flomax are also available.    SBA/Independant.    Orders Placed This Encounter      Regular Diet Adult   Appetite minimal. Encouraging intake.    Saline Lock.     Sam removed at 1700; PVR #1 4 mL, PVR #2 6 mL.    Call light within reach.    Discharge plan home with support.    [facility] Admission: 6- Walk 10 steps or more  [facility] Daily7-Walk 25 feet or more        Problem: UTI (Urinary Tract Infection)  Goal: Improved Infection Symptoms  Outcome: Progressing     Problem: Infection  Goal: Absence of Infection Signs and Symptoms  Outcome: Progressing     Problem: Pain Acute  Goal: Optimal Pain Control and Function  Outcome: Progressing

## 2024-11-30 NOTE — H&P
[facility]    History and Physical - Hospitalist Service       Date of Admission:  11/29/2024    Assessment & Plan      Divya aKsper is a 49 year old female admitted on 11/29/2024. She has a history of anxiety/depression and hypertension. She presented to the ER with a 2-week history of UTI symptoms and a 2-3 day history of nausea, vomiting, chills, and sweats. She is admitted for CT finding of nonobstructive stone with mild hydronephrosis, with concern for septic stone.    Concern for septic stone  Nonobstructive stone with mild hydronephrosis  Complicated UTI  Presented with 2-week history of UTI symptoms, 2-3 day history of nausea, vomiting, chills, sweats. Seen at urgent care virtual visit who advised presenting to ER given severity of symptoms and concern for sepsis. UA suspicious of infection, UC in process. CT A/P: 4 mm stone within the lower middle left ureter just proximal to the iliac crossing with minimal left-sided hydronephrosis. Tiny punctate nonobstructing stone within the right kidney.   -S/p ceftriaxone 1 g in ER, likely plan to continue tomorrow pending culture growth  -Pain control: Tylenol, Toradol, IV dilaudid for severe breakthrough pain  -Antiemetics: Zofran, compazine  -Urology consulted, appreciate expertise   -Cystoscopy with left retrograde pyelogram, left ureteral stent placement, intraoperative interpretation of fluoroscopic imaging    -Continue broad spectrum abx and tailor pending results. Recommend leaving the catheter in for at least 24 hours or until clinically improving. She will need to be set up for definitive stone surgery at a later date     Hyponatremia  Sodium 131 on admission.  -AM BMP    Hypertension  -Ordered & held PTA metoprolol 50 mg    Anxiety and depression  -Reordered PTA Effexor and Ativan        Diet: NPO per Anesthesia Guidelines for Procedure/Surgery Except for: Meds    DVT Prophylaxis: Pneumatic Compression Devices  Sam  "Catheter: Not present  Fluids: S/p 1 L bolus in ER  Lines: None     Cardiac Monitoring: None  Code Status: Full Code    Clinically Significant Risk Factors Present on Admission         # Hyponatremia: Lowest Na = 131 mmol/L in last 2 days, will monitor as appropriate  # Hypochloremia: Lowest Cl = 96 mmol/L in last 2 days, will monitor as appropriate                    # Overweight: Estimated body mass index is 25.06 kg/m  as calculated from the following:    Height as of this encounter: 1.626 m (5' 4\").    Weight as of this encounter: 66.2 kg (146 lb).              Disposition Plan      Expected Discharge Date: 12/01/2024                The patient's care was discussed with the Attending Physician, Dr. Valle , who will see the patient in the morning.      Nicole Villavicencio MD  Hospitalist Service  [facility]  Securely message with Workspace (more info)  Text page via [facility] Paging/Directory   ______________________________________________________________________    Chief Complaint   Concern for complicated UTI    History is obtained from the patient    History of Present Illness   Divya Kasper is a 49 year old female admitted on 11/29/2024. She has a history of anemia and anxiety. She presented to the ER with a 2-week history of UTI symptoms and a 2-3 day history of nausea, vomiting, chills, and sweats. She is admitted for CT finding of nonobstructive stone with mild hydronephrosis, with concern for septic stone.    Patient is resting comfortably in bed on my arrival, noting other symptom currently is suprapubic pressure from full bladder. She affirms 2-week history of UTI symptoms, noting that burning with urination was especially notable. She also affirms 2-3 day history of chills, sweats, and nausea. Patient endorses numerous UTIs in the past but no personal history of kidney stones. Past surgical history is notable for cholecystectomy which was uncomplicated, no issues with " anesthesia. Pain is minimal at this time.    Past Medical History    No past medical history on file.    Past Surgical History   No past surgical history on file.    Prior to Admission Medications   Prior to Admission Medications   Prescriptions Last Dose Informant Patient Reported? Taking?   LORazepam (ATIVAN) 0.5 MG tablet 11/29/2024 Evening  Yes Yes   Sig: Take 0.5 mg by mouth every 8 hours as needed for anxiety   ibuprofen (ADVIL/MOTRIN) 200 MG tablet 11/29/2024 Noon  Yes Yes   Sig: Take 800 mg by mouth every 8 hours as needed for pain.   metoprolol succinate ER (TOPROL-XL) 50 MG 24 hr tablet 11/28/2024 Bedtime  Yes Yes   Sig: Take 50 mg by mouth at bedtime.   omeprazole (PRILOSEC) 20 MG DR capsule 11/28/2024 Bedtime  Yes Yes   Sig: Take 20 mg by mouth at bedtime.   venlafaxine (EFFEXOR XR) 75 MG 24 hr capsule 11/28/2024 Bedtime  Yes Yes   Sig: Take 225 mg by mouth at bedtime.      Facility-Administered Medications: None        Social History   I have reviewed this patient's social history and updated it with pertinent information if needed.  Social History     Tobacco Use    Smoking status: Never    Smokeless tobacco: Never   Vaping Use    Vaping status: Never Used       Physical Exam   Vital Signs: Temp: 98.7  F (37.1  C) Temp src: Oral BP: 120/64 Pulse: 96   Resp: 18 SpO2: 100 % O2 Device: None (Room air)    Weight: 146 lbs 0 oz    Constitutional: Awake, alert, cooperative, no apparent distress.  HEENT: Normocephalic, atraumatic. Sclera/conjunctiva clear. No rhinorrhea. Moist mucus membranes.  Respiratory: No increased work of breathing, good air exchange, clear to auscultation bilaterally, no crackles or wheezing.  Cardiovascular: Regular rate and rhythm, normal S1 and S2, no S3 or S4, and no murmur noted.  GI: Soft, non-distended, non-tender, no masses palpated.  Back: Mild left-sided CVA tenderness, no right-sided CVA tenderness.  Skin: No suspicious rash or lesion on exposed skin.  Musculoskeletal: No  lower extremity edema.   Neurologic: Awake, alert, oriented to name, place and time. Normal tone.      Data     I have personally reviewed the following data over the past 24 hrs:    9.2  \   12.4   / 167     131 (L) 96 (L) 12.4 /  121 (H)   4.2 21 (L) 1.10 (H) \     Procal: N/A CRP: N/A Lactic Acid: 2.0         Imaging results reviewed over the past 24 hrs:   Recent Results (from the past 24 hours)   CT Abdomen Pelvis w/o Contrast    Narrative    EXAM: CT ABDOMEN PELVIS W/O CONTRAST  LOCATION: [facility]  DATE: 11/29/2024    INDICATION: Pyelonephritis. Rule out stone.  COMPARISON: None.  TECHNIQUE: CT scan of the abdomen and pelvis was performed without IV contrast. Multiplanar reformats were obtained. Dose reduction techniques were used.  CONTRAST: None.    FINDINGS:   LOWER CHEST: Normal.    HEPATOBILIARY: Cholecystectomy.    PANCREAS: Normal.    SPLEEN: Normal.    ADRENAL GLANDS: Normal.    KIDNEYS/BLADDER: Or millimeters stone in left ureter just proximal to the iliac crossing. Tiny punctate nonobstructing stone in the right kidney.    BOWEL: Normal.    LYMPH NODES: Normal.    VASCULATURE: Normal.    PELVIC ORGANS: Normal.    MUSCULOSKELETAL: Normal.      Impression    IMPRESSION:   1.  4 mm stone within the lower middle left ureter just proximal to the iliac crossing with minimal left-sided hydronephrosis. Tiny punctate nonobstructing stone within the right kidney.     XR Surgery EPI  Fluoro G/T 5 Min    Narrative    This exam was marked as non-reportable because it will not be read by a   radiologist or a [place] non-radiologist provider.

## 2024-11-30 NOTE — PLAN OF CARE
"Pt A&Ox4, VSS, RA, Afebrile.  Pt denies any pain, SOB or n/v.  Pt SBA with wgb  Tolerating diet  PIV patent & SL.  Sam in place.  Pt care ongoing, call light within reach, bed in lowest position, alarms on for safety. Discharge pending.    HLM Admission: 1- Lying in Bed  HLM Daily2- Bed Activities             Problem: Adult Inpatient Plan of Care  Goal: Plan of Care Review  Description: The Plan of Care Review/Shift note should be completed every shift.  The Outcome Evaluation is a brief statement about your assessment that the patient is improving, declining, or no change.  This information will be displayed automatically on your shift  note.  Outcome: Progressing  Goal: Patient-Specific Goal (Individualized)  Description: You can add care plan individualizations to a care plan. Examples of Individualization might be:  \"Parent requests to be called daily at 9am for status\", \"I have a hard time hearing out of my right ear\", or \"Do not touch me to wake me up as it startles  me\".  Outcome: Progressing  Goal: Absence of Hospital-Acquired Illness or Injury  Outcome: Progressing  Goal: Optimal Comfort and Wellbeing  Outcome: Progressing  Goal: Readiness for Transition of Care  Outcome: Progressing  Intervention: Mutually Develop Transition Plan  Recent Flowsheet Documentation  Taken 11/29/2024 2200 by Susanne Hensley RN  Equipment Currently Used at Home: none     Problem: UTI (Urinary Tract Infection)  Goal: Improved Infection Symptoms  Outcome: Progressing     Problem: Infection  Goal: Absence of Infection Signs and Symptoms  Outcome: Progressing     "

## 2024-11-30 NOTE — PROGRESS NOTES
"POST-OP CHECK    Divya Kasper was seen post-operatively after cystoscopy, retrograde pyelogram, ureteral stent replacement. Doing fine overall but notes discomfort from the catheter. Bedside RN discussed with urology who stated catheter needed to stay in until at least the morning. Catheter discomfort is not painful, more bothersome since it causes a sensation of needing to void.    /75 (BP Location: Right arm)   Pulse 101   Temp 98  F (36.7  C) (Oral)   Resp 18   Ht 1.626 m (5' 4.02\")   Wt 70.2 kg (154 lb 12.2 oz)   SpO2 99%   BMI 26.55 kg/m    GENERAL: Awake, alert. No acute distress.   HEENT: PERRLA. No scleral icterus or conjunctival injection. Moist mucus membranes.  SKIN: Warm and dry. No bruises, rashes, or skin lesions on exposed skin.  LUNGS: Normal work of breathing with no use of accessory muscles. Clear breath sounds in all lung fields bilaterally with no wheezes or crackles appreciated.  CARDIAC: RRR. Normal S1 and S2. No murmurs, clicks, or rubs appreciated.   ABDOMEN: Non-distended.   NEUROLOGIC: Alert and oriented. Normal tone.  EXTREMITIES: No gross deformity or peripheral edema. Appear well-perfused.     Assessment/plan:   -Continue on current plan as detailed in today's formal H&P  -Urology recommended pyrimidine for catheter/urinary discomfort, order placed  -PTA medications including Ativan available, would encourage therapeutic rest    Nicole Villavicencio MD, PGY-2  [facility]  11/29/24      "

## 2024-11-30 NOTE — PHARMACY-ADMISSION MEDICATION HISTORY
Pharmacist Admission Medication History    Admission medication history is complete. The information provided in this note is only as accurate as the sources available at the time of the update.    Information Source(s): Patient and CareEverywhere/SureScripts via in-person    Pertinent Information: n/a    Changes made to PTA medication list:  Added: ibuprofen, omeprazole  Deleted: None  Changed: effexor dose/form    Allergies reviewed with patient and updates made in EHR: yes    Medication History Completed By: Rbeeca Clifford [facility] 11/29/2024 6:29 PM    PTA Med List   Medication Sig Last Dose/Taking    ibuprofen (ADVIL/MOTRIN) 200 MG tablet Take 800 mg by mouth every 8 hours as needed for pain. 11/29/2024 Noon    LORazepam (ATIVAN) 0.5 MG tablet Take 0.5 mg by mouth every 8 hours as needed for anxiety 11/29/2024 Evening    metoprolol succinate ER (TOPROL-XL) 50 MG 24 hr tablet Take 50 mg by mouth at bedtime. 11/28/2024 Bedtime    omeprazole (PRILOSEC) 20 MG DR capsule Take 20 mg by mouth at bedtime. 11/28/2024 Bedtime    venlafaxine (EFFEXOR XR) 75 MG 24 hr capsule Take 225 mg by mouth at bedtime. 11/28/2024 Bedtime

## 2024-11-30 NOTE — OP NOTE
November 29, 2024    Preoperative diagnosis:  Ureteral stone  UTI symptoms  Pyuria  Tachycardia    Postoperative diagnosis: Same    Procedure: Cystoscopy with left retrograde pyelogram, left ureteral stent placement, intraoperative interpretation of fluoroscopic imaging    Surgeon: Salome Kendrick MD     Assistant: None    Anesthesia: MAC    EBL: minimal  UOP: not recorded  IVF: please see anesthesia record    Drains: 7x30 Fr, 16 Fr galaviz catheter    Complications: None noted    Specimens: None    Findings: Mild hydronephrosis able to pass up 7 Fr stent without difficulty    Indication for procedure: Divya Kasper is 49 year old presenting with UTI symptoms, tachycardia, pyuria and 4 mm ureteral stone.  Given the location of the stone and concern for infection we discussed options and recommendations for ureteral stenting maximize drainage of her kidney and the infected urine    Summary of procedure:  After patient was identified in the pre-operative area and procedure verified, informed consent was obtained.  After this patient was taken to the operating suite and placed in the supine position.  Intravenous rin-operative antibiotics were administered by anesthesia.  After appropriate anesthesia was induced and the airway secured, patient was placed in the dorsal lithotomy position in supportive yellow-fin stirrups with careful attention to neural safety in positioning of all four extremities.  At this time patient was prepped and draped in the standard fashion.      A time out was performed to confirm patient and procedure.    A 22 Fr rigid cystoscope was inserted into a normal appearing urethral meatus.  The urothelium was curosrly examined and was unremarkable.  Bilateral ureteral orifices were noted in the normal orthotopic position.  The left ureteral orifice was identified and cannulated with the wire.  The open ended was passed up to the renal pelvis and there was not a hydronephrotic drip.  A gentle  retrograde was done to confirm location of the renal pelvis.  The wire was then replaced, the open ended removed and a 7 x 30 Fr double J stent was placed without difficulty.  Wire removed and fluoroscopy confirmed correct placement in renal pelvis and bladder.  Sam catheter was placed for maximal drainage.    Counts were reported as correct    Patient went to the recovery room in good condition    I spoke to her mother Ashlee at the end of the case over the telephone    Plan: Admit to hospitalist team.  Continue broad spectrum abx and tailor pending results.  Recommend leaving the catheter in for at least 24 hours or until clinically improving.  May have oxybutynin and pyridium prn (ordered).  Also may have tamsulosin for stent discomfort if BP tolerates (not ordered)    She will need to be set up for definitive stone surgery at a later date    Salome Kendrick MD MPH  (she/her/hers)   of Urology  [facility]

## 2024-11-30 NOTE — PROGRESS NOTES
"November 30, 2024    Patient feels better today except for the galaviz catheter.  She states that the catheter is the worst thing    /79 (BP Location: Left arm)   Pulse 104   Temp 97.7  F (36.5  C) (Oral)   Resp 18   Ht 1.626 m (5' 4.02\")   Wt 63.9 kg (140 lb 12.8 oz)   SpO2 98%   BMI 24.16 kg/m    GENERAL: healthy, alert and no distress  EYES: Eyes grossly normal to inspection, conjunctivae and sclerae normal  HENT: normal cephalic/atraumatic.  External ears, nose and mouth without ulcers or lesions.  RESP: no audible wheeze, cough, or visible cyanosis.  No visible retractions or increased work of breathing.  Able to speak fully in complete sentences.  NEURO: Cranial nerves grossly intact, mentation intact and speech normal  PSYCH: mentation appears normal, affect normal/bright, judgement and insight intact, normal speech and appearance well-groomed    Galaviz catheter with clear urine draining    Labs  Blood culture yesterday with GNB  WBC 7.1 (9.2)  Cr 1.02 (1.1)  UCx still pending    On cetriaxone    A/P: 48yo F who presented with infected stone yesterday s/pt stenting with positive blood cultures    -Continue broad spectrum antibiotics and tailor per cultures  -She can continue flomax, oxybutynin, pyridium as needed for stent discomfort  -Will look to remove catheter later today as long as no fevers or other systemic signs of infection.  Once catheter is removed she needs PVRs to ensure she is emptying (would like to see less that 150mL)  -Will need to have urologic follow up as an outpatient     Salome Kendrick MD MPH  (she/her/hers)   of Urology  [facility]        "

## 2024-11-30 NOTE — PROVIDER NOTIFICATION
[facility] provider spoke to [facility]. OK to remove the galaviz and start voiding trial with PVR's < 150 mL.

## 2024-11-30 NOTE — CONSULTS
November 29, 2024    Referring Provider: [facility]    Primary Care Provider: Clinic, [facility]    Assessment & Plan     Nephrolithiasis    - Case Request: CYSTOSCOPY, WITH RETROGRADE PYELOGRAM AND URETERAL STENT REPLACEMENT; Standing  - Case Request: CYSTOSCOPY, WITH RETROGRADE PYELOGRAM AND URETERAL STENT REPLACEMENT    Urinary tract infection symptoms    - Case Request: CYSTOSCOPY, WITH RETROGRADE PYELOGRAM AND URETERAL STENT REPLACEMENT; Standing  - Case Request: CYSTOSCOPY, WITH RETROGRADE PYELOGRAM AND URETERAL STENT REPLACEMENT    Ureteral stone with hydronephrosis      UTI (urinary tract infection)      CLAY (acute kidney injury) (H)      Hyponatremia    Although the stone is small in the setting of infection discussed that recommend ureteral stenting to allow the infected urine to drain.  Discussed the procedure and risks to include but not limited to bleeding, infection, inability to place retrograde stent requiring antegrade decompression, need for future definitive stone surgery.  She expresses understanding, questions answered and agreeable to proceed    20 minutes were spent on this day of the encounter in reviewing the EMR including reviewing labs and interpretation of the CT scan, direct patient care including surgical counseling, coordination of care and documentation    Salome Kendrick MD MPH  (she/her/hers)   of Urology  [facility]      HPI:  Divya Kasper is a 49 year old female who presents for evaluation of infected kidney stone.  She has never had a kidney stone before.  She presents with 2 week history of UTI symptoms, nausea, vomiting, chills and sweats.     Recently had gallbladder removed    No urologic history    In ER feels unwell, tachycardic and low grade fever 99.7    No past medical history on file.    No past surgical history on file.    Social History     Socioeconomic History    Marital status: Single     Spouse  name: Not on file    Number of children: Not on file    Years of education: Not on file    Highest education level: Not on file   Occupational History    Not on file   Tobacco Use    Smoking status: Never    Smokeless tobacco: Never   Vaping Use    Vaping status: Never Used   Substance and Sexual Activity    Alcohol use: Not on file    Drug use: Not on file    Sexual activity: Not on file   Other Topics Concern    Not on file   Social History Narrative    Not on file     Social Drivers of Health     Financial Resource Strain: Low Risk  (1/18/2024)    [facility]    Financial Resource Strain     Difficulty of Paying Living Expenses: 3     Difficulty of Paying Living Expenses: Not on file   Food Insecurity: No Food Insecurity (1/18/2024)    [facility]    Food Insecurity     Do you worry your food will run out before you are able to buy more?: 1   Transportation Needs: No Transportation Needs (1/18/2024)    Received from [employer] [facility]    Transportation Needs     Does lack of transportation keep you from medical appointments?: 1     Does lack of transportation keep you from work, meetings or getting things that you need?: 1   Physical Activity: Not on file   Stress: Not on file   Social Connections: Socially Integrated (1/18/2024)    [facility]    Social Connections     Do you often feel lonely or isolated from those around you?: 0   Interpersonal Safety: Not on file   Housing Stability: Low Risk  (1/18/2024)    [facility]    Housing Stability     What is your housing situation today?: 1     No family history on file.    Allergies   Allergen Reactions    Sulfa Antibiotics Diarrhea and Dizziness       No current facility-administered medications for this  "encounter.     Current Outpatient Medications   Medication Sig Dispense Refill    ibuprofen (ADVIL/MOTRIN) 200 MG tablet Take 800 mg by mouth every 8 hours as needed for pain.      LORazepam (ATIVAN) 0.5 MG tablet Take 0.5 mg by mouth every 8 hours as needed for anxiety      metoprolol succinate ER (TOPROL-XL) 50 MG 24 hr tablet Take 50 mg by mouth at bedtime.      omeprazole (PRILOSEC) 20 MG DR capsule Take 20 mg by mouth at bedtime.      venlafaxine (EFFEXOR XR) 75 MG 24 hr capsule Take 225 mg by mouth at bedtime.         /64   Pulse 96   Temp 98.7  F (37.1  C) (Oral)   Resp 18   Ht 1.626 m (5' 4\")   Wt 66.2 kg (146 lb)   SpO2 100%   BMI 25.06 kg/m    GENERAL: healthy, alert and no distress  EYES: Eyes grossly normal to inspection, conjunctivae and sclerae normal  HENT: normal cephalic/atraumatic.  External ears, nose and mouth without ulcers or lesions.  RESP: no audible wheeze, cough, or visible cyanosis.  No visible retractions or increased work of breathing.  Able to speak fully in complete sentences.  NEURO: Cranial nerves grossly intact, mentation intact and speech normal  PSYCH: mentation appears normal, affect normal/bright, judgement and insight intact, normal speech and appearance well-groomed    Labs today  Urine 155 RBC, 73 WBC, 17 squamous cells  Lactic acid 2  BMP with Cr 1.1 (0.92 prior)  CBC WBC 9.2m Hgb 12.4, Platelet 167    CT scan with 4mm mid ureteral stone, mild hydronephrosis    CC  Patient Care Team:  Leslee, Dwayne Trejo as PCP - General  Oxana Munoz MD as Assigned PCP                "

## 2024-12-01 LAB
ANION GAP SERPL CALCULATED.3IONS-SCNC: 12 MMOL/L (ref 7–15)
BUN SERPL-MCNC: 15.1 MG/DL (ref 6–20)
CALCIUM SERPL-MCNC: 8.8 MG/DL (ref 8.8–10.4)
CHLORIDE SERPL-SCNC: 105 MMOL/L (ref 98–107)
CREAT SERPL-MCNC: 1.07 MG/DL (ref 0.51–0.95)
EGFRCR SERPLBLD CKD-EPI 2021: 64 ML/MIN/1.73M2
ERYTHROCYTE [DISTWIDTH] IN BLOOD BY AUTOMATED COUNT: 12.6 % (ref 10–15)
GLUCOSE SERPL-MCNC: 122 MG/DL (ref 70–99)
HCO3 SERPL-SCNC: 23 MMOL/L (ref 22–29)
HCT VFR BLD AUTO: 32.4 % (ref 35–47)
HGB BLD-MCNC: 11.1 G/DL (ref 11.7–15.7)
MCH RBC QN AUTO: 30.5 PG (ref 26.5–33)
MCHC RBC AUTO-ENTMCNC: 34.3 G/DL (ref 31.5–36.5)
MCV RBC AUTO: 89 FL (ref 78–100)
PLATELET # BLD AUTO: 199 10E3/UL (ref 150–450)
POTASSIUM SERPL-SCNC: 2.9 MMOL/L (ref 3.4–5.3)
POTASSIUM SERPL-SCNC: 3.9 MMOL/L (ref 3.4–5.3)
POTASSIUM SERPL-SCNC: 4 MMOL/L (ref 3.4–5.3)
RBC # BLD AUTO: 3.64 10E6/UL (ref 3.8–5.2)
SODIUM SERPL-SCNC: 140 MMOL/L (ref 135–145)
WBC # BLD AUTO: 5.6 10E3/UL (ref 4–11)

## 2024-12-01 PROCEDURE — 99232 SBSQ HOSP IP/OBS MODERATE 35: CPT | Mod: GC

## 2024-12-01 PROCEDURE — 120N000001 HC R&B MED SURG/OB

## 2024-12-01 PROCEDURE — 250N000013 HC RX MED GY IP 250 OP 250 PS 637

## 2024-12-01 PROCEDURE — 80048 BASIC METABOLIC PNL TOTAL CA: CPT

## 2024-12-01 PROCEDURE — 250N000013 HC RX MED GY IP 250 OP 250 PS 637: Performed by: FAMILY MEDICINE

## 2024-12-01 PROCEDURE — 84132 ASSAY OF SERUM POTASSIUM: CPT | Performed by: FAMILY MEDICINE

## 2024-12-01 PROCEDURE — 36415 COLL VENOUS BLD VENIPUNCTURE: CPT

## 2024-12-01 PROCEDURE — 85014 HEMATOCRIT: CPT

## 2024-12-01 PROCEDURE — 250N000011 HC RX IP 250 OP 636

## 2024-12-01 PROCEDURE — 36415 COLL VENOUS BLD VENIPUNCTURE: CPT | Performed by: FAMILY MEDICINE

## 2024-12-01 PROCEDURE — 84132 ASSAY OF SERUM POTASSIUM: CPT

## 2024-12-01 PROCEDURE — 99222 1ST HOSP IP/OBS MODERATE 55: CPT | Performed by: INTERNAL MEDICINE

## 2024-12-01 PROCEDURE — 87040 BLOOD CULTURE FOR BACTERIA: CPT

## 2024-12-01 RX ORDER — POTASSIUM CHLORIDE 1500 MG/1
20 TABLET, EXTENDED RELEASE ORAL ONCE
Status: DISCONTINUED | OUTPATIENT
Start: 2024-12-01 | End: 2024-12-01

## 2024-12-01 RX ORDER — HYDROXYZINE HYDROCHLORIDE 25 MG/1
25 TABLET, FILM COATED ORAL EVERY 6 HOURS PRN
Status: DISCONTINUED | OUTPATIENT
Start: 2024-12-01 | End: 2024-12-01

## 2024-12-01 RX ORDER — POTASSIUM CHLORIDE 1500 MG/1
20 TABLET, EXTENDED RELEASE ORAL ONCE
Status: COMPLETED | OUTPATIENT
Start: 2024-12-01 | End: 2024-12-01

## 2024-12-01 RX ORDER — POTASSIUM CHLORIDE 1500 MG/1
40 TABLET, EXTENDED RELEASE ORAL ONCE
Status: DISCONTINUED | OUTPATIENT
Start: 2024-12-01 | End: 2024-12-01

## 2024-12-01 RX ORDER — HYDROXYZINE HYDROCHLORIDE 25 MG/1
50 TABLET, FILM COATED ORAL EVERY 6 HOURS PRN
Status: DISCONTINUED | OUTPATIENT
Start: 2024-12-01 | End: 2024-12-01

## 2024-12-01 RX ORDER — POTASSIUM CHLORIDE 1500 MG/1
40 TABLET, EXTENDED RELEASE ORAL ONCE
Status: COMPLETED | OUTPATIENT
Start: 2024-12-01 | End: 2024-12-01

## 2024-12-01 RX ORDER — METOPROLOL SUCCINATE 25 MG/1
50 TABLET, EXTENDED RELEASE ORAL AT BEDTIME
Status: DISCONTINUED | OUTPATIENT
Start: 2024-12-01 | End: 2024-12-02 | Stop reason: HOSPADM

## 2024-12-01 RX ORDER — HYDROXYZINE HYDROCHLORIDE 25 MG/1
25 TABLET, FILM COATED ORAL 3 TIMES DAILY PRN
Status: DISCONTINUED | OUTPATIENT
Start: 2024-12-01 | End: 2024-12-02 | Stop reason: HOSPADM

## 2024-12-01 RX ADMIN — PHENAZOPYRIDINE 100 MG: 100 TABLET ORAL at 09:47

## 2024-12-01 RX ADMIN — PANTOPRAZOLE SODIUM 40 MG: 40 TABLET, DELAYED RELEASE ORAL at 20:20

## 2024-12-01 RX ADMIN — METOPROLOL SUCCINATE 50 MG: 25 TABLET, EXTENDED RELEASE ORAL at 20:20

## 2024-12-01 RX ADMIN — ACETAMINOPHEN 650 MG: 325 TABLET ORAL at 17:07

## 2024-12-01 RX ADMIN — VENLAFAXINE HYDROCHLORIDE 225 MG: 75 CAPSULE, EXTENDED RELEASE ORAL at 20:17

## 2024-12-01 RX ADMIN — POTASSIUM CHLORIDE 40 MEQ: 1500 TABLET, EXTENDED RELEASE ORAL at 08:54

## 2024-12-01 RX ADMIN — CEFTRIAXONE 1 G: 1 INJECTION, POWDER, FOR SOLUTION INTRAMUSCULAR; INTRAVENOUS at 16:42

## 2024-12-01 RX ADMIN — POTASSIUM CHLORIDE 20 MEQ: 1500 TABLET, EXTENDED RELEASE ORAL at 12:00

## 2024-12-01 RX ADMIN — LORAZEPAM 0.5 MG: 0.5 TABLET ORAL at 21:56

## 2024-12-01 RX ADMIN — OXYBUTYNIN CHLORIDE 5 MG: 5 TABLET, EXTENDED RELEASE ORAL at 09:47

## 2024-12-01 RX ADMIN — ACETAMINOPHEN 650 MG: 325 TABLET ORAL at 05:26

## 2024-12-01 RX ADMIN — PHENAZOPYRIDINE 100 MG: 100 TABLET ORAL at 18:19

## 2024-12-01 RX ADMIN — ACETAMINOPHEN 650 MG: 325 TABLET ORAL at 12:03

## 2024-12-01 RX ADMIN — LORAZEPAM 0.5 MG: 0.5 TABLET ORAL at 13:47

## 2024-12-01 ASSESSMENT — ACTIVITIES OF DAILY LIVING (ADL)
ADLS_ACUITY_SCORE: 32
ADLS_ACUITY_SCORE: 24
ADLS_ACUITY_SCORE: 25
ADLS_ACUITY_SCORE: 25
ADLS_ACUITY_SCORE: 24
ADLS_ACUITY_SCORE: 32
ADLS_ACUITY_SCORE: 27
ADLS_ACUITY_SCORE: 26
ADLS_ACUITY_SCORE: 27
ADLS_ACUITY_SCORE: 25
ADLS_ACUITY_SCORE: 32
ADLS_ACUITY_SCORE: 24
ADLS_ACUITY_SCORE: 32
ADLS_ACUITY_SCORE: 32
ADLS_ACUITY_SCORE: 27
ADLS_ACUITY_SCORE: 32
ADLS_ACUITY_SCORE: 25
ADLS_ACUITY_SCORE: 32
ADLS_ACUITY_SCORE: 27

## 2024-12-01 NOTE — PROGRESS NOTES
[facility]    Progress Note - Hospitalist Service       Date of Admission:  11/29/2024    Assessment & Plan   Divya Kasper is a 49 year old female admitted on 11/29/2024. She has a history of anxiety, depression, and hypertension and is admitted for UTI complicated by non-obstructive stone concerning for septic stone and mild hydronephrosis.      Concern for septic stone  Nonobstructive stone with mild hydronephrosis  Complicated UTI   2 week hx of UTI symptoms, 2-3 d of n/v, diaphoresis. UA suspicious for infection and CT A/P w 4mm stone on left side w/ hydronephrosis and non-obstructing stone in kidney. Cystoscopy and left stent on 11/29. Urine culture w/ e. Coli.  - IV Ceftriaxone 1 g q24h in the setting of UTI and E. Coli bacteremia. ID consulted w/ recommendations for switching to orals, see note from 12/1.   - Pain control: Tylenol, Toradol, IV dilaudid for breakthrough pain  - Antiemetics: Zofran, compazine  - Urology consulted, urology following  - Pyrimidine for urinary discomfort, also recommend PRN oxybutynin or flomax for catheter associated discomfort     E. Coli bacteremia  Blood cultures drawn on 11/29 positive on first day of incubation with E. Coli identified on verigene. Patient has been hemodynamically stable following cystoscopy and ureteral stent placement. Additional culture showing no growth after 1 day.   - AM Blood cultures   - AM CBC  - IV Ceftriaxone 1 g q24h  - Consider narrowing antibiotic choice based on sensitivities    Hyponatremia, resolved  Na 131 at admission, repeat BMP with Na within normal limits.  - AM BMP    Hypokalemia, resolved  On nursing protocol     Chronic Conditions:   - HTN: PTA metoprolol held, consider starting if BP elevated  - Anxiety and Depression: PTA Effexor, Ativan          Diet: Regular Diet Adult    DVT Prophylaxis: Pneumatic Compression Devices  Sam Catheter: Not present  Fluids: PO  Lines: None     Cardiac Monitoring:  "None  Code Status: Full Code      Clinically Significant Risk Factors        # Hypokalemia: Lowest K = 2.9 mmol/L in last 2 days, will replace as needed  # Hyponatremia: Lowest Na = 131 mmol/L in last 2 days, will monitor as appropriate  # Hypochloremia: Lowest Cl = 96 mmol/L in last 2 days, will monitor as appropriate  # Hypocalcemia: Lowest Ca = 8.4 mg/dL in last 2 days, will monitor and replace as appropriate                    # Overweight: Estimated body mass index is 26.29 kg/m  as calculated from the following:    Height as of this encounter: 1.626 m (5' 4.02\").    Weight as of this encounter: 69.5 kg (153 lb 3.5 oz)., PRESENT ON ADMISSION            Social Drivers of Health          Disposition Plan     Medically Ready for Discharge: Anticipated in 2-4 Days     The patient's care was discussed with the Attending Physician, Dr. Nadira Valle .    Nakul Pinzon MD  Hospitalist Service  [facility]  Securely message with [employer] (more info)  Text page via [employer] Paging/Directory   ______________________________________________________________________    Interval History   Divya doing well this morning. Patient endorses some bladder spasm and discomfort. Feeling better now that the catheter is out. Continues to tolerate fluids.     Physical Exam   Vital Signs: Temp: 99  F (37.2  C) Temp src: Oral BP: (!) 141/69 Pulse: 85   Resp: 18 SpO2: 100 % O2 Device: None (Room air)    Weight: 153 lbs 3.52 oz    GENERAL: Awake, alert, No acute distress.   HEENT: No scleral icterus or conjunctival injection.  SKIN: Warm and dry. No bruises, rashes, or skin lesions.  LUNGS: Normal work of breathing with no use of accessory muscles. Clear breath sounds in all lung fields bilaterally with no wheezes or crackles appreciated.  CARDIAC: RRR. Normal S1 and S2. No murmurs, clicks, or rubs appreciated. No peripheral edema.  ABDOMEN: Non-distended. Soft and non-tender throughout with no masses or " organomegaly. No CVA tenderness.  NEUROLOGIC: Alert and oriented. Sensation to light touch involving upper and lower extremities intact bilaterally.   EXTREMITIES: No gross deformity or peripheral edema. Appear well-perfused.     Data     I have personally reviewed the following data over the past 24 hrs:    5.6  \   11.1 (L)   / 199     140 105 15.1 /  122 (H)   2.9 (L) 23 1.07 (H) \       Imaging results reviewed over the past 24 hrs:   No results found for this or any previous visit (from the past 24 hours).

## 2024-12-01 NOTE — PLAN OF CARE
Problem: Adult Inpatient Plan of Care  Goal: Optimal Comfort and Wellbeing  Outcome: Progressing    Goal Outcome Evaluation:    Patient is awake and alert and oriented to time, place and person.    B/P: 139/82, T: 98.4, P: 90, R: 16.    RA to maintain saturations > 92 %.    C/O 7/10 pain . PRN's administered: Tylenol with good relief, Oxybutynin & Pyridium for urinary discomfort and bladder spasm with minimal relief.    Assessment: Patient is voiding well since galaviz removal; orange color. Experiencing urinary discomfort and occasional bladder spasms. ID consulted. Repeat blood cultures still pending. K+ protocol initiated; PO replacement with recheck @ 1600. Anxious at times; PRN PO Ativan provided with good relief. Loose BM today.    up Independently, up ad destin, and St. by assist.    Orders Placed This Encounter      Regular Diet Adult    Saline Lock.    Call light within reach.    Discharge plan home with homecare  unknown .    HLM Admission: 7- Walk 25 feet or more  HLM Daily7-Walk 25 feet or more          Problem: UTI (Urinary Tract Infection)  Goal: Improved Infection Symptoms  Outcome: Progressing     Problem: Pain Acute  Goal: Optimal Pain Control and Function  Intervention: Prevent or Manage Pain  Recent Flowsheet Documentation  Taken 12/1/2024 0929 by Sharri Gaitan, RN  Medication Review/Management: medications reviewed

## 2024-12-01 NOTE — H&P (VIEW-ONLY)
[facility]    Progress Note - Hospitalist Service       Date of Admission:  11/29/2024    Assessment & Plan   Divya Kasper is a 49 year old female admitted on 11/29/2024. She has a history of anxiety, depression, and hypertension and is admitted for UTI complicated by non-obstructive stone concerning for septic stone and mild hydronephrosis.      Concern for septic stone  Nonobstructive stone with mild hydronephrosis  Complicated UTI   2 week hx of UTI symptoms, 2-3 d of n/v, diaphoresis. UA suspicious for infection and CT A/P w 4mm stone on left side w/ hydronephrosis and non-obstructing stone in kidney. Cystoscopy and left stent on 11/29. Urine culture w/ e. Coli.  - IV Ceftriaxone 1 g q24h in the setting of UTI and E. Coli bacteremia. ID consulted w/ recommendations for switching to orals, see note from 12/1.   - Pain control: Tylenol, Toradol, IV dilaudid for breakthrough pain  - Antiemetics: Zofran, compazine  - Urology consulted, urology following  - Pyrimidine for urinary discomfort, also recommend PRN oxybutynin or flomax for catheter associated discomfort     E. Coli bacteremia  Blood cultures drawn on 11/29 positive on first day of incubation with E. Coli identified on verigene. Patient has been hemodynamically stable following cystoscopy and ureteral stent placement. Additional culture showing no growth after 1 day.   - AM Blood cultures   - AM CBC  - IV Ceftriaxone 1 g q24h  - Consider narrowing antibiotic choice based on sensitivities    Hyponatremia, resolved  Na 131 at admission, repeat BMP with Na within normal limits.  - AM BMP    Hypokalemia, resolved  On nursing protocol     Chronic Conditions:   - HTN: PTA metoprolol held, consider starting if BP elevated  - Anxiety and Depression: PTA Effexor, Ativan          Diet: Regular Diet Adult    DVT Prophylaxis: Pneumatic Compression Devices  Sam Catheter: Not present  Fluids: PO  Lines: None     Cardiac Monitoring:  "None  Code Status: Full Code      Clinically Significant Risk Factors        # Hypokalemia: Lowest K = 2.9 mmol/L in last 2 days, will replace as needed  # Hyponatremia: Lowest Na = 131 mmol/L in last 2 days, will monitor as appropriate  # Hypochloremia: Lowest Cl = 96 mmol/L in last 2 days, will monitor as appropriate  # Hypocalcemia: Lowest Ca = 8.4 mg/dL in last 2 days, will monitor and replace as appropriate                    # Overweight: Estimated body mass index is 26.29 kg/m  as calculated from the following:    Height as of this encounter: 1.626 m (5' 4.02\").    Weight as of this encounter: 69.5 kg (153 lb 3.5 oz)., PRESENT ON ADMISSION            Social Drivers of Health          Disposition Plan     Medically Ready for Discharge: Anticipated in 2-4 Days     The patient's care was discussed with the Attending Physician, Dr. Nadira Valle .    Nakul Pinzon MD  Hospitalist Service  [facility]  Securely message with [employer] (more info)  Text page via [employer] Paging/Directory   ______________________________________________________________________    Interval History   Divya doing well this morning. Patient endorses some bladder spasm and discomfort. Feeling better now that the catheter is out. Continues to tolerate fluids.     Physical Exam   Vital Signs: Temp: 99  F (37.2  C) Temp src: Oral BP: (!) 141/69 Pulse: 85   Resp: 18 SpO2: 100 % O2 Device: None (Room air)    Weight: 153 lbs 3.52 oz    GENERAL: Awake, alert, No acute distress.   HEENT: No scleral icterus or conjunctival injection.  SKIN: Warm and dry. No bruises, rashes, or skin lesions.  LUNGS: Normal work of breathing with no use of accessory muscles. Clear breath sounds in all lung fields bilaterally with no wheezes or crackles appreciated.  CARDIAC: RRR. Normal S1 and S2. No murmurs, clicks, or rubs appreciated. No peripheral edema.  ABDOMEN: Non-distended. Soft and non-tender throughout with no masses or " organomegaly. No CVA tenderness.  NEUROLOGIC: Alert and oriented. Sensation to light touch involving upper and lower extremities intact bilaterally.   EXTREMITIES: No gross deformity or peripheral edema. Appear well-perfused.     Data     I have personally reviewed the following data over the past 24 hrs:    5.6  \   11.1 (L)   / 199     140 105 15.1 /  122 (H)   2.9 (L) 23 1.07 (H) \       Imaging results reviewed over the past 24 hrs:   No results found for this or any previous visit (from the past 24 hours).

## 2024-12-01 NOTE — PLAN OF CARE
Pt is alert and oriented x4.  VSS on RA, intermittently tachycardic. Reporting 3/10 pain. PRN tylenol given to pt per request. SBA/ind in room, informed pt if believe they need help to use the call light. Reporting some discomfort with urination, but voiding spontaneously. Regular diet. PIV is SL. Potassium protocol added.     Problem: Adult Inpatient Plan of Care  Goal: Optimal Comfort and Wellbeing  Outcome: Progressing     Problem: UTI (Urinary Tract Infection)  Goal: Improved Infection Symptoms  Outcome: Progressing   Goal Outcome Evaluation:

## 2024-12-01 NOTE — CONSULTS
Consultation - Infectious Disease  [facility]  Divya Kasper,  1975, MRN [id number]    Admitting Dx: Hyponatremia [E87.1]  Nephrolithiasis [N20.0]  UTI (urinary tract infection) [N39.0]  CLAY (acute kidney injury) (H) [N17.9]  Ureteral stone with hydronephrosis [N13.2]  Urinary tract infection symptoms [R39.9]    PCP: Leslee, Dwayne Trejo, [phone]       ASSESSMENT   49-year-old woman without significant past medical history admitted with pyelonephritis.  ID is consulted for antibiotic management.    Acute pyelonephritis.  UTI symptoms for about 2 weeks, progressing with fevers and bodyaches.  CT scan showing left ureteral stone with mild hydronephrosis.  Status post left ureteral stent placement on .  Urine and blood culture growing pansensitive E. coli.  History of recurring urinary tract infections.  Sulfa allergy    Active Problems:    Urinary tract infection symptoms    Hyponatremia    Nephrolithiasis    UTI (urinary tract infection)    CLAY (acute kidney injury) (H)    Ureteral stone with hydronephrosis       PLAN   -Continue ceftriaxone while inpatient  -Okay to discharge with high-dose amoxicillin 1 g p.o. 3 times daily, which has good bioavailability.  Would continue for at least 2 weeks and until time of stone removal    Thank you for this consult. ID will sign-off. Call with questions    Nicolas Torres MD  [facility]  Direct messaging: [employer] Paging  On-Call ID provider: [phone], option: 9      ===========================================      Chief Complaint   <principal problem not specified>       HPI     We have been requested by Nadira Valle MD to evaluate Divya Kasper for the above.    History obtained by patient    Divya Kasper is a 49 year old woman with a history of recurring urinary tract infections.  She is followed by her primary care doctor and has been placed on prophylactic antibiotics in the past.  About 2  "weeks ago she developed increased frequency and urinary odor.  She had some antibiotics at home and started taking them for several days (either nitrofurantoin or cephalexin, she does not know) -1 pill daily.  She stopped taking these when symptoms did not improve.  She came to the ER after she began having flulike symptoms with fevers, chills, and bodyaches.  She initially went to urgent care and was referred to the ER.  CT scan showed left-sided kidney stone with mild hydronephrosis.  She was evaluated by urology and had a stent placed.  Clinically, symptoms have largely improved.  She still has some bladder spasms that she attributes to stent.          Review of Systems   Ten systems reviewed and negative except for what is noted in the HPI       Medical History  History of anxiety Surgical History  She  has no past surgical history on file.     Social History  Reviewed, and she  reports that she has never smoked. She has never used smokeless tobacco.  Social History     Social History Narrative    Not on file     Family History  family history is not on file.  family history reviewed and is not pertinent to the presenting problem.            Allergies     Allergies   Allergen Reactions    Sulfa Antibiotics Diarrhea and Dizziness         Antibiotics   Ceftriaxone 11/29-    Previous:  None      Physical Exam     Temp:  [98.2  F (36.8  C)-98.4  F (36.9  C)] 98.4  F (36.9  C)  Pulse:  [90-95] 90  Resp:  [16-18] 16  BP: (117-139)/(69-82) 139/82  SpO2:  [97 %-100 %] 100 %    /82 (BP Location: Right arm, Patient Position: Left side, Cuff Size: Adult Regular)   Pulse 90   Temp 98.4  F (36.9  C) (Oral)   Resp 16   Ht 1.626 m (5' 4.02\")   Wt 69.5 kg (153 lb 3.5 oz)   SpO2 100%   BMI 26.29 kg/m      GENERAL:  well-developed, well-nourished, sitting in bed in no acute distress.   HENT:  Head is normocephalic, atraumatic.   EYES:  Eyes have anicteric sclerae without conjunctival injection or stigmata of " "endocarditis.   NECK:  Supple.  LUNGS:  Clear to auscultation.  CARDIOVASCULAR:  Regular rate and rhythm with no murmurs, gallops or rubs.  ABDOMEN:  Normal bowel sounds, soft, nontender. No appreciable hepatosplenomegaly.  No CVA tenderness.  EXT: Extremities warm and without edema.  SKIN:  No acute rashes. No stigmata of endocarditis.  NEUROLOGIC:  Grossly nonfocal.      Cultures   11/29 urine culture: Greater than 100,000 colonies E. coli  11/29 blood culture: E. coli,  11/30 blood culture: No growth to date  12/1 blood culture: Pending    Susceptibility data from last 90 days.  Collected Specimen Info Organism Ampicillin Ampicillin/Sulbactam Cefazolin Cefepime Cefoxitin Ceftazidime Ceftriaxone Ciprofloxacin Gentamicin Levofloxacin Meropenem Nitrofurantoin Piperacillin/Tazobactam Tobramycin   11/29/24 Blood from Line, venous Escherichia coli  S  S   S   S  S  S  S  S  S   S  S   11/29/24 Urine, Midstream Escherichia coli  S  S  S  S  S  S  S  S  S  S   S  S  S     Collected Specimen Info Organism Trimethoprim/Sulfamethoxazole    11/29/24 Blood from Line, venous Escherichia coli  S   11/29/24 Urine, Midstream Escherichia coli  S        Laboratory results     Recent Labs   Lab 12/01/24  0559 11/30/24  0934 11/29/24  1715   WBC 5.6 7.1 9.2   HGB 11.1* 11.5* 12.4    177 167       Recent Labs   Lab 12/01/24  0559 11/30/24  0934 11/29/24  1715    138 131*   CO2 23 20* 21*   BUN 15.1 12.3 12.4   ALBUMIN  --  3.5  --    ALKPHOS  --  110  --    ALT  --  49  --    AST  --  30  --        No results for input(s): \"CRPI\", \"SED\" in the last 168 hours.        Imaging   Radiology results reviewed    XR Surgery EPI  Fluoro G/T 5 Min    Result Date: 11/29/2024  This exam was marked as non-reportable because it will not be read by a radiologist or a [place] non-radiologist provider.     CT Abdomen Pelvis w/o Contrast    Result Date: 11/29/2024  EXAM: CT ABDOMEN PELVIS W/O CONTRAST LOCATION: [facility] DATE: 11/29/2024 INDICATION: Pyelonephritis. Rule out stone. COMPARISON: None. TECHNIQUE: CT scan of the abdomen and pelvis was performed without IV contrast. Multiplanar reformats were obtained. Dose reduction techniques were used. CONTRAST: None. FINDINGS: LOWER CHEST: Normal. HEPATOBILIARY: Cholecystectomy. PANCREAS: Normal. SPLEEN: Normal. ADRENAL GLANDS: Normal. KIDNEYS/BLADDER: Or millimeters stone in left ureter just proximal to the iliac crossing. Tiny punctate nonobstructing stone in the right kidney. BOWEL: Normal. LYMPH NODES: Normal. VASCULATURE: Normal. PELVIC ORGANS: Normal. MUSCULOSKELETAL: Normal.     IMPRESSION: 1.  4 mm stone within the lower middle left ureter just proximal to the iliac crossing with minimal left-sided hydronephrosis. Tiny punctate nonobstructing stone within the right kidney.       Data reviewed today: I reviewed all medications, new labs and imaging results over the last 24 hours. I personally reviewed the abdominal CT image(s) showing kidney stone .  The patient's care was discussed with the Patient and Patient's Family.

## 2024-12-02 VITALS
TEMPERATURE: 98 F | DIASTOLIC BLOOD PRESSURE: 71 MMHG | SYSTOLIC BLOOD PRESSURE: 120 MMHG | OXYGEN SATURATION: 96 % | HEIGHT: 64 IN | HEART RATE: 85 BPM | WEIGHT: 153.22 LBS | BODY MASS INDEX: 26.16 KG/M2 | RESPIRATION RATE: 16 BRPM

## 2024-12-02 LAB
ANION GAP SERPL CALCULATED.3IONS-SCNC: 9 MMOL/L (ref 7–15)
BACTERIA BLD CULT: ABNORMAL
BACTERIA BLD CULT: ABNORMAL
BUN SERPL-MCNC: 15.4 MG/DL (ref 6–20)
CALCIUM SERPL-MCNC: 8.5 MG/DL (ref 8.8–10.4)
CHLORIDE SERPL-SCNC: 107 MMOL/L (ref 98–107)
CREAT SERPL-MCNC: 0.92 MG/DL (ref 0.51–0.95)
EGFRCR SERPLBLD CKD-EPI 2021: 77 ML/MIN/1.73M2
ERYTHROCYTE [DISTWIDTH] IN BLOOD BY AUTOMATED COUNT: 12.9 % (ref 10–15)
GLUCOSE SERPL-MCNC: 88 MG/DL (ref 70–99)
HCO3 SERPL-SCNC: 22 MMOL/L (ref 22–29)
HCT VFR BLD AUTO: 33.8 % (ref 35–47)
HGB BLD-MCNC: 11.4 G/DL (ref 11.7–15.7)
MCH RBC QN AUTO: 30.4 PG (ref 26.5–33)
MCHC RBC AUTO-ENTMCNC: 33.7 G/DL (ref 31.5–36.5)
MCV RBC AUTO: 90 FL (ref 78–100)
PLATELET # BLD AUTO: 209 10E3/UL (ref 150–450)
POTASSIUM SERPL-SCNC: 4.2 MMOL/L (ref 3.4–5.3)
RBC # BLD AUTO: 3.75 10E6/UL (ref 3.8–5.2)
SODIUM SERPL-SCNC: 138 MMOL/L (ref 135–145)
WBC # BLD AUTO: 4.5 10E3/UL (ref 4–11)

## 2024-12-02 PROCEDURE — 250N000013 HC RX MED GY IP 250 OP 250 PS 637

## 2024-12-02 PROCEDURE — 85027 COMPLETE CBC AUTOMATED: CPT

## 2024-12-02 PROCEDURE — 87040 BLOOD CULTURE FOR BACTERIA: CPT

## 2024-12-02 PROCEDURE — 36415 COLL VENOUS BLD VENIPUNCTURE: CPT

## 2024-12-02 PROCEDURE — 80048 BASIC METABOLIC PNL TOTAL CA: CPT

## 2024-12-02 RX ORDER — AMOXICILLIN 500 MG/1
1000 CAPSULE ORAL 3 TIMES DAILY
Qty: 126 CAPSULE | Refills: 0 | Status: SHIPPED | OUTPATIENT
Start: 2024-12-02 | End: 2024-12-23

## 2024-12-02 RX ORDER — OXYBUTYNIN CHLORIDE 5 MG/1
5 TABLET, EXTENDED RELEASE ORAL DAILY PRN
Qty: 30 TABLET | Refills: 0 | Status: SHIPPED | OUTPATIENT
Start: 2024-12-02

## 2024-12-02 RX ORDER — AMOXICILLIN 500 MG/1
1000 CAPSULE ORAL 3 TIMES DAILY
Qty: 126 CAPSULE | Refills: 0 | Status: SHIPPED | OUTPATIENT
Start: 2024-12-02 | End: 2024-12-02

## 2024-12-02 RX ORDER — TAMSULOSIN HYDROCHLORIDE 0.4 MG/1
0.4 CAPSULE ORAL DAILY PRN
Qty: 30 CAPSULE | Refills: 0 | Status: SHIPPED | OUTPATIENT
Start: 2024-12-02

## 2024-12-02 RX ORDER — PHENAZOPYRIDINE HYDROCHLORIDE 100 MG/1
100 TABLET, FILM COATED ORAL 3 TIMES DAILY PRN
Qty: 90 TABLET | Refills: 0 | Status: SHIPPED | OUTPATIENT
Start: 2024-12-02

## 2024-12-02 RX ADMIN — ACETAMINOPHEN 650 MG: 325 TABLET ORAL at 08:23

## 2024-12-02 RX ADMIN — ACETAMINOPHEN 650 MG: 325 TABLET ORAL at 00:41

## 2024-12-02 ASSESSMENT — ACTIVITIES OF DAILY LIVING (ADL)
ADLS_ACUITY_SCORE: 28
ADLS_ACUITY_SCORE: 32
ADLS_ACUITY_SCORE: 28

## 2024-12-02 NOTE — PLAN OF CARE
Problem: Adult Inpatient Plan of Care  Goal: Optimal Comfort and Wellbeing  Outcome: Progressing  Intervention: Monitor Pain and Promote Comfort  Recent Flowsheet Documentation  Taken 12/2/2024 0041 by Corrie Chandler RN  Pain Management Interventions: medication (see MAR)     Patient A&O.  VSS except tachycardia.  Denies nausea or SOB.  Took tylenol for abdominal cramping related to some diarrhea she had been experiencing this evening.  Up independently in room.      HLM Admission: 7- Walk 25 feet or more  HLM Daily7-Walk 25 feet or more

## 2024-12-02 NOTE — PLAN OF CARE
Pt. A/Ox4 and able to make needs known. VSS except for tachycardia. Pt. States this is her baseline. Evening metoprolol administered. Low grade temp controlled with PRN oral tylenol. Urinary discomfort controlled with PRN pyridium. Pt. Anxious about going home. Therapeutic communication provided. Reassurance provided. PRN ativan administered this evening per pt. Request. Poor appetite. Fluids pushed. IND in room. Pt. Calls appropriately. Plan of care ongoing.   Problem: Adult Inpatient Plan of Care  Goal: Optimal Comfort and Wellbeing  Outcome: Progressing  Intervention: Monitor Pain and Promote Comfort  Recent Flowsheet Documentation  Taken 12/1/2024 1755 by Hannah Urias, RN  Pain Management Interventions:   declines   rest  Taken 12/1/2024 1707 by Hannah Urias, RN  Pain Management Interventions: medication (see MAR)  Taken 12/1/2024 1634 by Hannah Urias, RN  Pain Management Interventions: medication (see MAR)     Problem: UTI (Urinary Tract Infection)  Goal: Improved Infection Symptoms  Outcome: Progressing

## 2024-12-02 NOTE — PROGRESS NOTES
Reviewed with patient discharge AVS and copy given.  PIV removed.  Belonging sent with patient.  Ecorted to entrance via wheelcair and transported home by son.  Patient acknowledge information

## 2024-12-02 NOTE — DISCHARGE SUMMARY
"[facility]  Discharge Summary - Medicine & Pediatrics       Date of Admission:  11/29/2024  Date of Discharge:  12/2/2024  Discharging Provider: Ragini guerrero MD PGY-1 Attending physician: Dr Duque  Discharge Service: Hospitalist Service    Discharge Diagnoses   Nonobstructive stone with mild hydronephrosis  Complicated UTI     Clinically Significant Risk Factors     # Overweight: Estimated body mass index is 26.29 kg/m  as calculated from the following:    Height as of this encounter: 1.626 m (5' 4.02\").    Weight as of this encounter: 69.5 kg (153 lb 3.5 oz).       Follow-ups Needed After Discharge   Follow-up Appointments       Follow-up and recommended labs and tests       Follow up with primary care provider, [facility], within 7 days for hospital follow- up.    Follow up with urologist in 2-3 weeks for definitive management of kidney stone.                Unresulted Labs Ordered in the Past 30 Days of this Admission       Date and Time Order Name Status Description    12/2/2024 12:01 AM Blood Culture Arm, Right In process     12/1/2024 12:00 AM Blood Culture Peripheral Blood Preliminary     11/30/2024 11:23 AM Blood Culture Peripheral Blood Preliminary         These results will be followed up by PCP.    Discharge Disposition   Discharged to home  Condition at discharge: Stable    Hospital Course   Divya Kasper was admitted on 11/29/2024 for complicated UTI and nonobstructive ureteral stone in setting of hydronephrosis and sepsis.  The following problems were addressed during her hospitalization:    Concern for septic stone  Nonobstructive stone with mild hydronephrosis  Complicated UTI   Patient presented with 2 week hx of UTI symptoms, 2-3 d of n/v, diaphoresis. UA suspicious for infection and CT A/P w 4mm stone on left side w/ hydronephrosis and non-obstructing stone in kidney. Cystoscopy and left stent on 11/29. Urine culture w/ e. Coli.  Received IV " Ceftriaxone 1 g q24h in the setting of UTI and E. Coli bacteremia. ID consulted w/ recommendations for switching to orals.per ID patient was prescribed 3 weeks supply of amoxicillin high-dose.  For pain  control patient received Tylenol, Toradol, IV dilaudid.For nausea patient received Zofran and compazine.Urology consulted, recommended patient to be discharged at with oxybutynin, Pyridium and Flomax.  -Urology will do definitive treatment as outpatient.     E. Coli bacteremia  Blood cultures drawn on 11/29 positive on first day of incubation with E. Coli identified on verigene. Patient has been hemodynamically stable following cystoscopy and ureteral stent placement. Additional culture showing no growth after 1 day.   -Patient discharged with 3-week supply of amoxicillin       Consultations This Hospital Stay   UROLOGY IP CONSULT  INFECTIOUS DISEASES IP CONSULT    Code Status   Full Code       The patient was discussed with Dr. Neto Aguilar MD PGY-1    [address] [address]  Phone: [phone]  Fax: [phone]  ______________________________________________________________________    Physical Exam   Vital Signs: Temp: 98  F (36.7  C) Temp src: Oral BP: 120/71 Pulse: 85   Resp: 16 SpO2: 96 % O2 Device: None (Room air)    Weight: 153 lbs 3.52 oz  Constitutional: awake, alert, cooperative, no apparent distress, and appears stated age  Respiratory: No increased work of breathing, good air exchange, clear to auscultation bilaterally, no crackles or wheezing  Cardiovascular: Normal apical impulse, regular rate and rhythm, normal S1 and S2, no S3 or S4, and no murmur noted  GI: No scars, normal bowel sounds, soft, non-distended, non-tender, no masses palpated, no hepatosplenomegally    Neurologic: Awake, alert, oriented to name, place and time.  Cranial nerves II-XII are grossly intact.  Motor is 5 out of 5 bilaterally.  Cerebellar  finger to nose, heel to shin intact.  Sensory is intact.  Babinski down going, Romberg negative, and gait is normal.      Primary Care Physician   Dwayne Spivey [facility]    Discharge Orders      Reason for your hospital stay    Kidney infection     Follow-up and recommended labs and tests     Follow up with primary care provider, [facility], within 7 days for hospital follow- up.    Follow up with urologist in 2-3 weeks for definitive management of kidney stone.     Activity    Your activity upon discharge: activity as tolerated     Diet    Follow this diet upon discharge: Current Diet:Orders Placed This Encounter      Regular Diet Adult       Significant Results and Procedures   Results for orders placed or performed during the hospital encounter of 11/29/24   CT Abdomen Pelvis w/o Contrast    Narrative    EXAM: CT ABDOMEN PELVIS W/O CONTRAST  LOCATION: [facility]  DATE: 11/29/2024    INDICATION: Pyelonephritis. Rule out stone.  COMPARISON: None.  TECHNIQUE: CT scan of the abdomen and pelvis was performed without IV contrast. Multiplanar reformats were obtained. Dose reduction techniques were used.  CONTRAST: None.    FINDINGS:   LOWER CHEST: Normal.    HEPATOBILIARY: Cholecystectomy.    PANCREAS: Normal.    SPLEEN: Normal.    ADRENAL GLANDS: Normal.    KIDNEYS/BLADDER: Or millimeters stone in left ureter just proximal to the iliac crossing. Tiny punctate nonobstructing stone in the right kidney.    BOWEL: Normal.    LYMPH NODES: Normal.    VASCULATURE: Normal.    PELVIC ORGANS: Normal.    MUSCULOSKELETAL: Normal.      Impression    IMPRESSION:   1.  4 mm stone within the lower middle left ureter just proximal to the iliac crossing with minimal left-sided hydronephrosis. Tiny punctate nonobstructing stone within the right kidney.     XR Surgery EPI  Fluoro G/T 5 Min    Narrative    This exam was marked as non-reportable because it will not be read by a   radiologist  or a [place] non-radiologist provider.             Discharge Medications   Current Discharge Medication List        START taking these medications    Details   amoxicillin (AMOXIL) 500 MG capsule Take 2 capsules (1,000 mg) by mouth 3 times daily.  Qty: 126 capsule, Refills: 0    Associated Diagnoses: Ureteral stone with hydronephrosis      oxyBUTYnin ER (DITROPAN XL) 5 MG 24 hr tablet Take 1 tablet (5 mg) by mouth daily as needed for bladder spasms.  Qty: 30 tablet, Refills: 0    Associated Diagnoses: Nephrolithiasis      phenazopyridine (PYRIDIUM) 100 MG tablet Take 1 tablet (100 mg) by mouth 3 times daily as needed for urinary tract discomfort.  Qty: 90 tablet, Refills: 0    Associated Diagnoses: Urinary tract infection symptoms      tamsulosin (FLOMAX) 0.4 MG capsule Take 1 capsule (0.4 mg) by mouth daily as needed (catheter associated discomfort).  Qty: 30 capsule, Refills: 0    Associated Diagnoses: Nephrolithiasis           CONTINUE these medications which have NOT CHANGED    Details   ibuprofen (ADVIL/MOTRIN) 200 MG tablet Take 800 mg by mouth every 8 hours as needed for pain.      LORazepam (ATIVAN) 0.5 MG tablet Take 0.5 mg by mouth every 8 hours as needed for anxiety      metoprolol succinate ER (TOPROL-XL) 50 MG 24 hr tablet Take 50 mg by mouth at bedtime.      omeprazole (PRILOSEC) 20 MG DR capsule Take 20 mg by mouth at bedtime.      venlafaxine (EFFEXOR XR) 75 MG 24 hr capsule Take 225 mg by mouth at bedtime.           Allergies   Allergies   Allergen Reactions    Sulfa Antibiotics Diarrhea and Dizziness

## 2024-12-02 NOTE — PROGRESS NOTES
[facility] Urology Progress Note      Assessment and Plan:     Divya Kasper is a 49 year old year old female with a history of HTN who was admitted for 4mm left ureteral stone in the setting of UTI, s/p cystoscopy with retrograde pyelogram s/p stent placement with Dr. Kendrick on 11/30/24. UC and BC positive for pan-sensitive E.Coli. Treated with ceftriaxone until bacteremia clears. ID recommends high dose amoxicillin 1g PO TID x14 days or until stone treatment. Afebrile, hemodynamically stable. WBC 4.5 (5.6). Hbg 11.4 (11.1). Cr 0.92 (1.07).    - UC with pan sensitive E.Coli. Recommended discharge with at least 1 week of appropriate abx  - Recommend discharge with pyridium, Flomax and oxybutynin for discomfort  - Definitive stone management in 2-3 weeks. Case request sent     Discussed the case with Dr. Fuentes who is in agreement with the plan    Nadira Burch PA-C  [facility] Urology  Office: [phone]  Pager: Marshal ([facility] 7:30AM-4PM,  7:30AM-4PM, Tu OFF)          Interval History:     Patient doing well. Mild dysuria and intermittent abdominal discomfort. Patient tolerating PO. No nausea, vomiting or fever.          Medications:     Current Facility-Administered Medications   Medication Dose Route Frequency Provider Last Rate Last Admin    acetaminophen (TYLENOL) tablet 650 mg  650 mg Oral Q4H PRN Nicole Villavicencio MD   650 mg at 12/02/24 0823    Or    acetaminophen (TYLENOL) Suppository 650 mg  650 mg Rectal Q4H PRN Nicole Villavicencio MD        calcium carbonate (TUMS) chewable tablet 1,000 mg  1,000 mg Oral 4x Daily PRN Nicole Villavicencio MD        cefTRIAXone (ROCEPHIN) 1 g vial to attach to  mL bag for ADULTS or NS 50 mL bag for PEDS  1 g Intravenous Q24H Nakul Pinzon MD   1 g at 12/01/24 1642    HYDROmorphone (PF) (DILAUDID) injection 0.2 mg  0.2 mg Intravenous Q2H PRN Nicole Villavicencio MD        HYDROmorphone (PF) (DILAUDID)  injection 0.4 mg  0.4 mg Intravenous Q2H PRN Nicole Villavicencio MD        hydrOXYzine HCl (ATARAX) tablet 25 mg  25 mg Oral TID PRN Elisa Snyder MD        lidocaine (LMX4) cream   Topical Q1H PRN Nicole Villavicencio MD        lidocaine 1 % 0.1-1 mL  0.1-1 mL Other Q1H PRN Nicole Villavicencio MD        LORazepam (ATIVAN) tablet 0.5 mg  0.5 mg Oral Q8H PRN Nicole Villavicencio MD   0.5 mg at 12/01/24 2156    melatonin tablet 5 mg  5 mg Oral At Bedtime PRN Nicole Villavicencio MD        metoprolol succinate ER (TOPROL XL) 24 hr tablet 50 mg  50 mg Oral At Bedtime Elisa Snyder MD   50 mg at 12/01/24 2020    naloxone (NARCAN) injection 0.2 mg  0.2 mg Intravenous Q2 Min PRN Nicole Villavicencio MD        Or    naloxone (NARCAN) injection 0.4 mg  0.4 mg Intravenous Q2 Min PRN Nicole Villavicencio MD        Or    naloxone (NARCAN) injection 0.2 mg  0.2 mg Intramuscular Q2 Min PRN Nicole Villavicencio MD        Or    naloxone (NARCAN) injection 0.4 mg  0.4 mg Intramuscular Q2 Min PRN Nicole Villavicencio MD        ondansetron (ZOFRAN ODT) ODT tab 4 mg  4 mg Oral Q6H PRN Nicole Villavicencio MD        Or    ondansetron (ZOFRAN) injection 4 mg  4 mg Intravenous Q6H PRNicole Singh MD        oxyBUTYnin ER (DITROPAN XL) 24 hr tablet 5 mg  5 mg Oral Daily PRN Kamilah Marroquin MD   5 mg at 12/01/24 0947    pantoprazole (PROTONIX) EC tablet 40 mg  40 mg Oral At Bedtime Nicole Villavicencio MD   40 mg at 12/01/24 2020    Patient is already receiving mechanical prophylaxis   Does not apply Continuous PRN Nicole Villavicencio MD        phenazopyridine (PYRIDIUM) tablet 100 mg  100 mg Oral TID PRN Nicole Villavicencio MD   100 mg at 12/01/24 1819    polyethylene glycol (MIRALAX) Packet 17 g  17 g Oral BID PRN Nicole Villavicencio MD        prochlorperazine (COMPAZINE) injection 10 mg  10 mg  Intravenous Q6H PRN Nicole Villavicencio MD        Or    prochlorperazine (COMPAZINE) tablet 10 mg  10 mg Oral Q6H PRN Nicole Villavicencio MD        senna-docusate (SENOKOT-S/PERICOLACE) 8.6-50 MG per tablet 1 tablet  1 tablet Oral BID PRN Nicole Villavicencio MD        Or    senna-docusate (SENOKOT-S/PERICOLACE) 8.6-50 MG per tablet 2 tablet  2 tablet Oral BID PRN Nicole Villavicencio MD        sodium chloride (PF) 0.9% PF flush 3 mL  3 mL Intracatheter Q8H Nicole Villavicencio MD   3 mL at 12/02/24 0823    sodium chloride (PF) 0.9% PF flush 3 mL  3 mL Intracatheter q1 min prn Nicole Villavicencio MD        tamsulosin (FLOMAX) capsule 0.4 mg  0.4 mg Oral Daily PRN Kamilah Marroquin MD        venlafaxine (EFFEXOR XR) 24 hr capsule 225 mg  225 mg Oral At Bedtime Nicole Villavicencio MD   225 mg at 12/01/24 2017              Physical Exam:   Vitals were reviewed  Patient Vitals for the past 8 hrs:   BP Temp Temp src Pulse Resp SpO2   12/02/24 0820 120/71 98  F (36.7  C) Oral 85 16 96 %     GEN: NAD, lying in bed  EYES: EOMI       Laboratory Results:   CBC RESULTS:  Recent Labs   Lab Test 12/02/24  0555 12/01/24  0559 11/30/24  0934 11/29/24  1715   WBC 4.5 5.6 7.1 9.2   HGB 11.4* 11.1* 11.5* 12.4    199 177 167        BMP RESULTS:  Recent Labs   Lab Test 12/02/24  0555 12/01/24  1818 12/01/24  1614 12/01/24  0559 11/30/24  0934 11/29/24  1715 03/30/22  2113 09/02/18  1623     --   --  140 138 131*   < > 137   POTASSIUM 4.2 3.9 4.0 2.9* 3.6 4.2   < > 3.8   CHLORIDE 107  --   --  105 106 96*   < > 103   CO2 22  --   --  23 20* 21*   < > 25   ANIONGAP 9  --   --  12 12 14   < > 9   GLC 88  --   --  122* 125* 121*   < > 117   BUN 15.4  --   --  15.1 12.3 12.4   < > 12   CR 0.92  --   --  1.07* 1.02* 1.10*   < > 0.97   GFRESTIMATED 77  --   --  64 68 62   < > >60   GFRESTBLACK  --   --   --   --   --   --   --  >60    < > = values in this interval not  "displayed.       CALCIUM RESULTS:  Recent Labs   Lab Test 12/02/24  0555 12/01/24  0559 11/30/24  0934 11/29/24  1715   AMANDA 8.5* 8.8 8.4* 8.4*       PTH RESULTS:  No results for input(s): \"PTHI\" in the last 23582 hours.    HGB A1C RESULTS:  No results found for: \"A1C\"    UA RESULTS:   Recent Labs   Lab Test 11/29/24  1648   SG 1.022   URINEPH 6.0   NITRITE Negative   RBCU 155*   WBCU 73*       PSA RESULTS  No results found for: \"PSA\"    Recent Imaging Report    I personally reviewed all applicable imaging and went over the below findings with patient.    Results for orders placed or performed during the hospital encounter of 11/29/24   CT Abdomen Pelvis w/o Contrast    Narrative    EXAM: CT ABDOMEN PELVIS W/O CONTRAST  LOCATION: [facility]  DATE: 11/29/2024    INDICATION: Pyelonephritis. Rule out stone.  COMPARISON: None.  TECHNIQUE: CT scan of the abdomen and pelvis was performed without IV contrast. Multiplanar reformats were obtained. Dose reduction techniques were used.  CONTRAST: None.    FINDINGS:   LOWER CHEST: Normal.    HEPATOBILIARY: Cholecystectomy.    PANCREAS: Normal.    SPLEEN: Normal.    ADRENAL GLANDS: Normal.    KIDNEYS/BLADDER: Or millimeters stone in left ureter just proximal to the iliac crossing. Tiny punctate nonobstructing stone in the right kidney.    BOWEL: Normal.    LYMPH NODES: Normal.    VASCULATURE: Normal.    PELVIC ORGANS: Normal.    MUSCULOSKELETAL: Normal.      Impression    IMPRESSION:   1.  4 mm stone within the lower middle left ureter just proximal to the iliac crossing with minimal left-sided hydronephrosis. Tiny punctate nonobstructing stone within the right kidney.     XR Surgery EPI  Fluoro G/T 5 Min    Narrative    This exam was marked as non-reportable because it will not be read by a   radiologist or a [place] non-radiologist provider.           "

## 2024-12-02 NOTE — PLAN OF CARE
"  Problem: Adult Inpatient Plan of Care  Goal: Optimal Comfort and Wellbeing  Outcome: Progressing  Intervention: Monitor Pain and Promote Comfort  Recent Flowsheet Documentation  Taken 12/2/2024 0909 by Radha Barnett RN  Pain Management Interventions: rest  Taken 12/2/2024 0823 by Radha Barnett, RN  Pain Management Interventions: medication (see MAR)     Problem: Pain Acute  Goal: Optimal Pain Control and Function  Outcome: Progressing  Intervention: Prevent or Manage Pain  Recent Flowsheet Documentation  Taken 12/2/2024 0823 by Radha Barnett, RN  Sleep/Rest Enhancement:   natural light exposure provided   noise level reduced   regular sleep/rest pattern promoted   room darkened  Medication Review/Management: medications reviewed   Goal Outcome Evaluation:    Patient alert and oriented x 4, speech clear.  Lungs clear on room air.  Patient reports,\"Burning with urination rated 5/10 given Tylenol prn and resting in bed.\"  Last BM 12/1/24.  Independent with bed mobility and ADL's.  Urine culture >100,000 E Coli, blood cultures pending. PIV patent and flushed and capped      "

## 2024-12-04 ENCOUNTER — TELEPHONE (OUTPATIENT)
Dept: UROLOGY | Facility: CLINIC | Age: 49
End: 2024-12-04
Payer: COMMERCIAL

## 2024-12-05 ENCOUNTER — TELEPHONE (OUTPATIENT)
Dept: UROLOGY | Facility: CLINIC | Age: 49
End: 2024-12-05
Payer: COMMERCIAL

## 2024-12-05 LAB
BACTERIA BLD CULT: NO GROWTH
BACTERIA BLD CULT: NORMAL
BACTERIA BLD CULT: NORMAL

## 2024-12-07 LAB — BACTERIA BLD CULT: NO GROWTH

## 2024-12-09 ENCOUNTER — TELEPHONE (OUTPATIENT)
Dept: UROLOGY | Facility: CLINIC | Age: 49
End: 2024-12-09
Payer: COMMERCIAL

## 2024-12-09 NOTE — TELEPHONE ENCOUNTER
Update Dr Belle ok using H&P from 11-29 and will update DOS    Spoke with: Patient       Date of surgery: Monday Dec 16 2024 with Dr Belle       Location: [facility]      Informed patient they will need a adult : YES      Pre op with provider: Recent OR Hospital H&P 11-29   Message sent to Dr Belle to make sure he is ok doing pre op DOS       H&P Scheduled in PAC- NA         Pre procedure covid :Not req      Additional imaging: NA        Surgery Packet : Sent via [employer]      Additional comments: Please call for surgery teaching

## 2024-12-13 ENCOUNTER — ANESTHESIA EVENT (OUTPATIENT)
Dept: SURGERY | Facility: AMBULATORY SURGERY CENTER | Age: 49
End: 2024-12-13
Payer: COMMERCIAL

## 2024-12-16 ENCOUNTER — ANESTHESIA (OUTPATIENT)
Dept: SURGERY | Facility: AMBULATORY SURGERY CENTER | Age: 49
End: 2024-12-16
Payer: COMMERCIAL

## 2024-12-16 ENCOUNTER — TELEPHONE (OUTPATIENT)
Dept: UROLOGY | Facility: CLINIC | Age: 49
End: 2024-12-16
Payer: COMMERCIAL

## 2024-12-16 DIAGNOSIS — N13.2 URETERAL STONE WITH HYDRONEPHROSIS: ICD-10-CM

## 2024-12-16 RX ORDER — AMOXICILLIN 500 MG/1
1000 CAPSULE ORAL 3 TIMES DAILY
Qty: 39 CAPSULE | Refills: 0 | Status: SHIPPED | OUTPATIENT
Start: 2024-12-16

## 2024-12-16 NOTE — TELEPHONE ENCOUNTER
Spoke with patient who states that she has been doing fine on the antibiotic and would like an extension to take her up to her surgery day.  Medication sent to her pharmacy per provider. Caren House RN

## 2024-12-30 ENCOUNTER — HOSPITAL ENCOUNTER (OUTPATIENT)
Facility: AMBULATORY SURGERY CENTER | Age: 49
Discharge: HOME | End: 2024-12-30
Attending: UROLOGY
Payer: COMMERCIAL

## 2024-12-30 VITALS
HEART RATE: 98 BPM | DIASTOLIC BLOOD PRESSURE: 66 MMHG | HEIGHT: 64 IN | TEMPERATURE: 97.3 F | OXYGEN SATURATION: 96 % | BODY MASS INDEX: 24.92 KG/M2 | SYSTOLIC BLOOD PRESSURE: 106 MMHG | WEIGHT: 146 LBS | RESPIRATION RATE: 16 BRPM

## 2024-12-30 DIAGNOSIS — N20.0 NEPHROLITHIASIS: ICD-10-CM

## 2024-12-30 RX ORDER — LIDOCAINE HYDROCHLORIDE 20 MG/ML
INJECTION, SOLUTION INFILTRATION; PERINEURAL PRN
Status: DISCONTINUED | OUTPATIENT
Start: 2024-12-30 | End: 2024-12-30

## 2024-12-30 RX ORDER — ACETAMINOPHEN 325 MG/1
975 TABLET ORAL ONCE
Status: DISCONTINUED | OUTPATIENT
Start: 2024-12-30 | End: 2024-12-31 | Stop reason: HOSPADM

## 2024-12-30 RX ORDER — ONDANSETRON 4 MG/1
4 TABLET, ORALLY DISINTEGRATING ORAL EVERY 30 MIN PRN
Status: DISCONTINUED | OUTPATIENT
Start: 2024-12-30 | End: 2024-12-31 | Stop reason: HOSPADM

## 2024-12-30 RX ORDER — FENTANYL CITRATE 0.05 MG/ML
50 INJECTION, SOLUTION INTRAMUSCULAR; INTRAVENOUS
Status: DISCONTINUED | OUTPATIENT
Start: 2024-12-30 | End: 2024-12-31 | Stop reason: HOSPADM

## 2024-12-30 RX ORDER — PROPOFOL 10 MG/ML
INJECTION, EMULSION INTRAVENOUS PRN
Status: DISCONTINUED | OUTPATIENT
Start: 2024-12-30 | End: 2024-12-30

## 2024-12-30 RX ORDER — ONDANSETRON 2 MG/ML
INJECTION INTRAMUSCULAR; INTRAVENOUS PRN
Status: DISCONTINUED | OUTPATIENT
Start: 2024-12-30 | End: 2024-12-30

## 2024-12-30 RX ORDER — OXYCODONE HYDROCHLORIDE 5 MG/1
5 TABLET ORAL
Status: DISCONTINUED | OUTPATIENT
Start: 2024-12-30 | End: 2024-12-31 | Stop reason: HOSPADM

## 2024-12-30 RX ORDER — SODIUM CHLORIDE, SODIUM LACTATE, POTASSIUM CHLORIDE, CALCIUM CHLORIDE 600; 310; 30; 20 MG/100ML; MG/100ML; MG/100ML; MG/100ML
INJECTION, SOLUTION INTRAVENOUS CONTINUOUS
Status: DISCONTINUED | OUTPATIENT
Start: 2024-12-30 | End: 2024-12-31 | Stop reason: HOSPADM

## 2024-12-30 RX ORDER — LIDOCAINE HYDROCHLORIDE 20 MG/ML
JELLY TOPICAL PRN
Status: DISCONTINUED | OUTPATIENT
Start: 2024-12-30 | End: 2024-12-30 | Stop reason: HOSPADM

## 2024-12-30 RX ORDER — NALOXONE HYDROCHLORIDE 0.4 MG/ML
0.1 INJECTION, SOLUTION INTRAMUSCULAR; INTRAVENOUS; SUBCUTANEOUS
Status: DISCONTINUED | OUTPATIENT
Start: 2024-12-30 | End: 2024-12-31 | Stop reason: HOSPADM

## 2024-12-30 RX ORDER — HYDROMORPHONE HCL IN WATER/PF 6 MG/30 ML
0.2 PATIENT CONTROLLED ANALGESIA SYRINGE INTRAVENOUS EVERY 5 MIN PRN
Status: DISCONTINUED | OUTPATIENT
Start: 2024-12-30 | End: 2024-12-31 | Stop reason: HOSPADM

## 2024-12-30 RX ORDER — ONDANSETRON 2 MG/ML
4 INJECTION INTRAMUSCULAR; INTRAVENOUS EVERY 30 MIN PRN
Status: DISCONTINUED | OUTPATIENT
Start: 2024-12-30 | End: 2024-12-31 | Stop reason: HOSPADM

## 2024-12-30 RX ORDER — DEXMEDETOMIDINE HYDROCHLORIDE 4 UG/ML
INJECTION, SOLUTION INTRAVENOUS PRN
Status: DISCONTINUED | OUTPATIENT
Start: 2024-12-30 | End: 2024-12-30

## 2024-12-30 RX ORDER — KETOROLAC TROMETHAMINE 30 MG/ML
INJECTION, SOLUTION INTRAMUSCULAR; INTRAVENOUS PRN
Status: DISCONTINUED | OUTPATIENT
Start: 2024-12-30 | End: 2024-12-30

## 2024-12-30 RX ORDER — FENTANYL CITRATE 0.05 MG/ML
50 INJECTION, SOLUTION INTRAMUSCULAR; INTRAVENOUS EVERY 5 MIN PRN
Status: DISCONTINUED | OUTPATIENT
Start: 2024-12-30 | End: 2024-12-31 | Stop reason: HOSPADM

## 2024-12-30 RX ORDER — ACETAMINOPHEN 650 MG/1
650 SUPPOSITORY RECTAL ONCE
Status: DISCONTINUED | OUTPATIENT
Start: 2024-12-30 | End: 2024-12-31 | Stop reason: HOSPADM

## 2024-12-30 RX ORDER — SODIUM CHLORIDE, SODIUM LACTATE, POTASSIUM CHLORIDE, CALCIUM CHLORIDE 600; 310; 30; 20 MG/100ML; MG/100ML; MG/100ML; MG/100ML
INJECTION, SOLUTION INTRAVENOUS CONTINUOUS PRN
Status: DISCONTINUED | OUTPATIENT
Start: 2024-12-30 | End: 2024-12-30

## 2024-12-30 RX ORDER — GLYCOPYRROLATE 0.2 MG/ML
INJECTION, SOLUTION INTRAMUSCULAR; INTRAVENOUS PRN
Status: DISCONTINUED | OUTPATIENT
Start: 2024-12-30 | End: 2024-12-30

## 2024-12-30 RX ORDER — DEXAMETHASONE SODIUM PHOSPHATE 4 MG/ML
INJECTION, SOLUTION INTRA-ARTICULAR; INTRALESIONAL; INTRAMUSCULAR; INTRAVENOUS; SOFT TISSUE PRN
Status: DISCONTINUED | OUTPATIENT
Start: 2024-12-30 | End: 2024-12-30

## 2024-12-30 RX ORDER — LIDOCAINE 40 MG/G
CREAM TOPICAL
Status: DISCONTINUED | OUTPATIENT
Start: 2024-12-30 | End: 2024-12-31 | Stop reason: HOSPADM

## 2024-12-30 RX ORDER — DEXAMETHASONE SODIUM PHOSPHATE 4 MG/ML
4 INJECTION, SOLUTION INTRA-ARTICULAR; INTRALESIONAL; INTRAMUSCULAR; INTRAVENOUS; SOFT TISSUE
Status: DISCONTINUED | OUTPATIENT
Start: 2024-12-30 | End: 2024-12-31 | Stop reason: HOSPADM

## 2024-12-30 RX ORDER — FENTANYL CITRATE 50 UG/ML
INJECTION, SOLUTION INTRAMUSCULAR; INTRAVENOUS PRN
Status: DISCONTINUED | OUTPATIENT
Start: 2024-12-30 | End: 2024-12-30

## 2024-12-30 RX ORDER — CEFAZOLIN SODIUM 2 G/100ML
2 INJECTION, SOLUTION INTRAVENOUS SEE ADMIN INSTRUCTIONS
Status: DISCONTINUED | OUTPATIENT
Start: 2024-12-30 | End: 2024-12-31 | Stop reason: HOSPADM

## 2024-12-30 RX ORDER — CEFAZOLIN SODIUM 2 G/100ML
2 INJECTION, SOLUTION INTRAVENOUS
Status: COMPLETED | OUTPATIENT
Start: 2024-12-30 | End: 2024-12-30

## 2024-12-30 RX ORDER — ACETAMINOPHEN 325 MG/1
975 TABLET ORAL ONCE
Status: COMPLETED | OUTPATIENT
Start: 2024-12-30 | End: 2024-12-30

## 2024-12-30 RX ORDER — PROPOFOL 10 MG/ML
INJECTION, EMULSION INTRAVENOUS CONTINUOUS PRN
Status: DISCONTINUED | OUTPATIENT
Start: 2024-12-30 | End: 2024-12-30

## 2024-12-30 RX ORDER — HYDROMORPHONE HCL IN WATER/PF 6 MG/30 ML
0.4 PATIENT CONTROLLED ANALGESIA SYRINGE INTRAVENOUS EVERY 5 MIN PRN
Status: DISCONTINUED | OUTPATIENT
Start: 2024-12-30 | End: 2024-12-31 | Stop reason: HOSPADM

## 2024-12-30 RX ORDER — FENTANYL CITRATE 0.05 MG/ML
25 INJECTION, SOLUTION INTRAMUSCULAR; INTRAVENOUS EVERY 5 MIN PRN
Status: DISCONTINUED | OUTPATIENT
Start: 2024-12-30 | End: 2024-12-31 | Stop reason: HOSPADM

## 2024-12-30 RX ADMIN — ACETAMINOPHEN 975 MG: 325 TABLET ORAL at 09:54

## 2024-12-30 RX ADMIN — DEXMEDETOMIDINE HYDROCHLORIDE 12 MCG: 4 INJECTION, SOLUTION INTRAVENOUS at 11:22

## 2024-12-30 RX ADMIN — DEXAMETHASONE SODIUM PHOSPHATE 8 MG: 4 INJECTION, SOLUTION INTRA-ARTICULAR; INTRALESIONAL; INTRAMUSCULAR; INTRAVENOUS; SOFT TISSUE at 11:27

## 2024-12-30 RX ADMIN — SODIUM CHLORIDE, SODIUM LACTATE, POTASSIUM CHLORIDE, CALCIUM CHLORIDE: 600; 310; 30; 20 INJECTION, SOLUTION INTRAVENOUS at 10:36

## 2024-12-30 RX ADMIN — CEFAZOLIN SODIUM 2 G: 2 INJECTION, SOLUTION INTRAVENOUS at 11:11

## 2024-12-30 RX ADMIN — KETOROLAC TROMETHAMINE 15 MG: 30 INJECTION, SOLUTION INTRAMUSCULAR; INTRAVENOUS at 11:35

## 2024-12-30 RX ADMIN — FENTANYL CITRATE 100 MCG: 50 INJECTION, SOLUTION INTRAMUSCULAR; INTRAVENOUS at 11:18

## 2024-12-30 RX ADMIN — ONDANSETRON 4 MG: 2 INJECTION INTRAMUSCULAR; INTRAVENOUS at 11:36

## 2024-12-30 RX ADMIN — LIDOCAINE HYDROCHLORIDE 60 MG: 20 INJECTION, SOLUTION INFILTRATION; PERINEURAL at 11:18

## 2024-12-30 RX ADMIN — PROPOFOL 200 MCG/KG/MIN: 10 INJECTION, EMULSION INTRAVENOUS at 11:20

## 2024-12-30 RX ADMIN — PROPOFOL 200 MG: 10 INJECTION, EMULSION INTRAVENOUS at 11:18

## 2024-12-30 RX ADMIN — GLYCOPYRROLATE 0.2 MG: 0.2 INJECTION, SOLUTION INTRAMUSCULAR; INTRAVENOUS at 11:18

## 2024-12-30 NOTE — BRIEF OP NOTE
[facility] Brief Operative Note    Pre-operative diagnosis: Nephrolithiasis [N20.0]   Post-operative diagnosis left ureteral stone   Procedure: Procedure(s):  CYSTOURETEROSCOPY, WITH RETROGRADE PYELOGRAM, STENT Removal, Stone Extraction   Surgeon: Bernardo Belle MD   Assistants(s): None   Estimated blood loss: 1 ml    Specimens: Left ureteral stone   Findings: Left ureteral stone basketed  No stent replaced

## 2024-12-30 NOTE — INTERVAL H&P NOTE
The History and Physical has been reviewed, the patient has been examined and no changes have occurred in the patient's condition since the H & P was completed.      We discussed the benefits and risks of left ureteroscopy, including but not limited to, bleeding, infection, need for stent/stent related symptoms, injury to ureter, need for second procedure if unable to reach stone or residual fragments.

## 2024-12-30 NOTE — ANESTHESIA PREPROCEDURE EVALUATION
Anesthesia Pre-Procedure Evaluation    Patient: Divya Kasper   MRN: [id number] : 1975        Procedure : Procedure(s):  CYSTOURETEROSCOPY, WITH RETROGRADE PYELOGRAM, HOLMIUM LASER LITHOTRIPSY OF URETERAL CALCULUS, STENT REPLACEMENT          Past Medical History:   Diagnosis Date    Anemia     Hypertension     Irregular heart beat     Renal disease       Past Surgical History:   Procedure Laterality Date    COMBINED CYSTOSCOPY, RETROGRADES, EXCHANGE STENT URETER(S) Left 2024    Procedure: CYSTOSCOPY, WITH RETROGRADE PYELOGRAM AND URETERAL STENT REPLACEMENT;  Surgeon: Salome Kendrick MD;  Location: [facility] Main OR      Allergies   Allergen Reactions    Sulfa Antibiotics Diarrhea and Dizziness      Social History     Tobacco Use    Smoking status: Never    Smokeless tobacco: Never   Substance Use Topics    Alcohol use: Not Currently      Wt Readings from Last 1 Encounters:   24 66.2 kg (146 lb)        Anesthesia Evaluation   Pt has had prior anesthetic.     No history of anesthetic complications       ROS/MED HX  ENT/Pulmonary:  - neg pulmonary ROS     Neurologic:  - neg neurologic ROS     Cardiovascular: Comment: Palpitations, on metoprolol      METS/Exercise Tolerance:     Hematologic:  - neg hematologic  ROS     Musculoskeletal:       GI/Hepatic:     (+) GERD, Asymptomatic on medication,                  Renal/Genitourinary:     (+)       Nephrolithiasis ,       Endo:  - neg endo ROS     Psychiatric/Substance Use:     (+) psychiatric history anxiety and depression       Infectious Disease:  - neg infectious disease ROS     Malignancy:       Other:            Physical Exam    Airway        Mallampati: II   TM distance: > 3 FB   Neck ROM: full     Respiratory Devices and Support         Dental     Comment: Fair condition, no loose teeth      B=Bridge, C=Chipped, L=Loose, M=Missing    Cardiovascular          Rhythm and rate: regular and normal     Pulmonary           breath sounds clear  to auscultation           OUTSIDE LABS:  CBC:   Lab Results   Component Value Date    WBC 4.5 12/02/2024    WBC 5.6 12/01/2024    HGB 11.4 (L) 12/02/2024    HGB 11.1 (L) 12/01/2024    HCT 33.8 (L) 12/02/2024    HCT 32.4 (L) 12/01/2024     12/02/2024     12/01/2024     BMP:   Lab Results   Component Value Date     12/02/2024     12/01/2024    POTASSIUM 4.2 12/02/2024    POTASSIUM 3.9 12/01/2024    CHLORIDE 107 12/02/2024    CHLORIDE 105 12/01/2024    CO2 22 12/02/2024    CO2 23 12/01/2024    BUN 15.4 12/02/2024    BUN 15.1 12/01/2024    CR 0.92 12/02/2024    CR 1.07 (H) 12/01/2024    GLC 88 12/02/2024     (H) 12/01/2024     COAGS:   Lab Results   Component Value Date    INR 1.03 03/30/2022     POC:   Lab Results   Component Value Date    HCG Negative 11/29/2024     HEPATIC:   Lab Results   Component Value Date    ALBUMIN 3.5 11/30/2024    PROTTOTAL 6.7 11/30/2024    ALT 49 11/30/2024    AST 30 11/30/2024    ALKPHOS 110 11/30/2024    BILITOTAL 0.3 11/30/2024     OTHER:   Lab Results   Component Value Date    LACT 2.0 11/29/2024    AMANDA 8.5 (L) 12/02/2024    LIPASE 93 (H) 03/30/2022       Anesthesia Plan    ASA Status:  2       Anesthesia Type: General.     - Airway: LMA              Consents    Anesthesia Plan(s) and associated risks, benefits, and realistic alternatives discussed. Questions answered and patient/representative(s) expressed understanding.     - Discussed: Risks, Benefits and Alternatives for BOTH SEDATION and the PROCEDURE were discussed     - Discussed with:  Patient            Postoperative Care            Comments:               Bita Ybarra MD    I have reviewed the pertinent notes and labs in the chart from the past 30 days and (re)examined the patient.  Any updates or changes from those notes are reflected in this note.                         # Overweight: Estimated body mass index is 25.06 kg/m  as calculated from the following:    Height as of this  "encounter: 1.626 m (5' 4\").    Weight as of this encounter: 66.2 kg (146 lb).             "

## 2024-12-30 NOTE — ANESTHESIA POSTPROCEDURE EVALUATION
Patient: Divya Kasper    Procedure: Procedure(s):  CYSTOURETEROSCOPY, WITH RETROGRADE PYELOGRAM, STENT Removal, Stone Extraction       Anesthesia Type:  General    Note:  Disposition: Outpatient   Postop Pain Control: Uneventful            Sign Out: Well controlled pain   PONV: No   Neuro/Psych: Uneventful            Sign Out: Acceptable/Baseline neuro status   Airway/Respiratory: Uneventful            Sign Out: Acceptable/Baseline resp. status   CV/Hemodynamics: Uneventful            Sign Out: Acceptable CV status; No obvious hypovolemia; No obvious fluid overload   Other NRE:    DID A NON-ROUTINE EVENT OCCUR? No           Last vitals:  Vitals Value Taken Time   BP 92/51 12/30/24 1216   Temp 97.1  F (36.2  C) 12/30/24 1156   Pulse 108 12/30/24 1219   Resp 16 12/30/24 1216   SpO2 91 % 12/30/24 1219   Vitals shown include unfiled device data.    Electronically Signed By: Bita Ybarra MD  December 30, 2024  2:11 PM

## 2024-12-30 NOTE — DISCHARGE INSTRUCTIONS
"If you have any questions or concerns regarding your procedure please contact Dr. Belle, his office number is [phone].    You have received 975 mg of Acetaminophen (Tylenol) at 9:54 AM. Please do not take an additional dose of Tylenol until after 3:54 PM.     Do not exceed 4,000 mg of acetaminophen during a 24 hour period and keep in mind that acetaminophen can also be found in many over-the-counter cold medications as well as narcotics that may be given for pain.     You received a dose of IV Toradol at 11:30am. Please do not take any additional Ibuprofen/NSAID products (Aleve/Advil/Motrin/Naproxen/Celebrex) until after 5:30pm.          Post-Operative Symptom Control    While you recover from your procedure, you can take steps to ease your recovery.    Diet and Fluids:    Eating your normal diet is fine.  Drink a little more than normal but you don not have to \"flush\" your system.    Activity:    There are no activity restrictions after stone surgery.  Some people find bending twisting movements cause discomfort or increased blood in urine.  Activity may irritating but you will not hurt yourself.    Call the Clinic Immediately If You Have Any Of The Following Symptoms:   Nausea/vomiting that is uncontrolled with medications   You have a fever over 100.0   Chills   Are not able to urinate for 8 hours    Increasing back pain that is not relieved with pain medications   Large amounts of blood in urine or large clots    We can respond to your questions or concerns 24 hours a day at [phone].   For after hours phone calls please wait on call to be connected with the \"care connection\" service for after hours care.     Follow up:  We will have you follow-up in clinic in 2 to 3 months after kidney renal ultrasound.     Adult Discharge Orders & Instructions     For 24 hours after surgery    Get plenty of rest.  A responsible adult must stay with you for at least 24 hours after you leave the hospital.   Do not " drive or use heavy equipment.  If you have weakness or tingling, don't drive or use heavy equipment until this feeling goes away.  Do not drink alcohol.  Avoid strenuous or risky activities.  Ask for help when climbing stairs.   You may feel lightheaded.  IF so, sit for a few minutes before standing.  Have someone help you get up.   If you have nausea (feel sick to your stomach): Drink only clear liquids such as apple juice, ginger ale, broth or 7-Up.  Rest may also help.  Be sure to drink enough fluids.  Move to a regular diet as you feel able.  You may have a slight fever. Call the doctor if your fever is over 100 F (37.7 C) (taken under the tongue) or lasts longer than 24 hours.  You may have a dry mouth, a sore throat, muscle aches or trouble sleeping.  These should go away after 24 hours.  Do not make important or legal decisions.     Call your doctor for any of the followin.  Signs of infection (fever, growing tenderness at the surgery site, a large amount of drainage or bleeding, severe pain, foul-smelling drainage, redness, swelling).    2. It has been over 8 to 10 hours since surgery and you are still not able to urinate (pass water).    3.  Headache for over 24 hours.

## 2024-12-30 NOTE — ANESTHESIA CARE TRANSFER NOTE
Patient: Divya Ksaper    Procedure: Procedure(s):  CYSTOURETEROSCOPY, WITH RETROGRADE PYELOGRAM, STENT Removal, Stone Extraction       Diagnosis: Nephrolithiasis [N20.0]  Diagnosis Additional Information: No value filed.    Anesthesia Type:   General     Note:    Oropharynx: oropharynx clear of all foreign objects  Level of Consciousness: drowsy  Oxygen Supplementation: face mask  Level of Supplemental Oxygen (L/min / FiO2): 8  Independent Airway: airway patency satisfactory and stable  Dentition: dentition unchanged  Vital Signs Stable: post-procedure vital signs reviewed and stable  Report to RN Given: handoff report given  Patient transferred to: PACU    Handoff Report: Identifed the Patient, Identified the Reponsible Provider, Reviewed the pertinent medical history, Discussed the surgical course, Reviewed Intra-OP anesthesia mangement and issues during anesthesia, Set expectations for post-procedure period and Allowed opportunity for questions and acknowledgement of understanding      Vitals:  Vitals Value Taken Time   /70 12/30/24 1156   Temp 97.1  F (36.2  C) 12/30/24 1156   Pulse 108 12/30/24 1157   Resp 16 12/30/24 1156   SpO2 98 % 12/30/24 1157   Vitals shown include unfiled device data.    Electronically Signed By: XAVI Velez CRNA  December 30, 2024  11:59 AM

## 2024-12-31 NOTE — OP NOTE
OPERATIVE REPORT    PREOPERATIVE DIAGNOSIS:  Left ureteral stone status post stent placement    POSTOPERATIVE DIAGNOSIS: Same    PROCEDURES PERFORMED:   Cystoscopy  Left ureteroscopy with stone basket extraction  Left retrograde pyelogram  IntraOp interpretation fluoroscopic imaging    STAFF SURGEON: Bernardo Belle MD  ASSISTANT(S): None  ANESTHESIA: General  ESTIMATED BLOOD LOSS: 1 ml  COMPLICATIONS: None.   SPECIMEN: Left ureteral stone for analysis    SIGNIFICANT FINDINGS:      Left ureteral stone basketed  No stent replaced    BRIEF OPERATIVE INDICATIONS: Divya Kasper is a(n) 49 year old female who presented with an acute obstructing ureteral stone with concern for urinary tract infection on November 29, 2024.  After a discussion of all risks, benefits, and alternatives, the patient elected to proceed with left ureteroscopy.    DESCRIPTION OF PROCEDURE:  After informed consent was obtained, the patient was transported to the operating room & placed supine on the table. After adequate anesthesia was induced, the patient was placed in lithotomy and prepped and draped in the usual sterile fashion. A timeout was taken to confirm correct patient, procedure and laterality. Pre-operative IV antibiotics were administered.     A 22 [place] cystourethroscope inserted through the urethra and the bladder.  There is some left ureteral orifice edema from the stent but no other abnormalities visualized.  Left stent was externalized.    I inserted a Storz Flex XC digital ureteroscope in the left ureteral orifice and found the stone within the mid ureter and this was removed via engage basket extraction.    I went up to the kidney and had a retropyelogram which was unremarkable.  I did aspirate some old blood likely from irritation from the stent by aspiration with the scope.    Pullback ureteroscopy is unremarkable I did not replace the stent.    They were awakened from anesthesia and transferred to the PACU.       POSTOP  PLAN:  Follow-up as an outpatient with renal ultrasound in 8 to 12 weeks

## 2025-01-01 LAB
APPEARANCE STONE: NORMAL
COMPN STONE: NORMAL
SPECIMEN WT: 31 MG

## 2025-03-03 ENCOUNTER — TELEPHONE (OUTPATIENT)
Dept: UROLOGY | Facility: CLINIC | Age: 50
End: 2025-03-03

## 2025-03-03 NOTE — TELEPHONE ENCOUNTER
Message left for patient to call back to nurse to reschedule her ultrasound and clinic appointment. Will also send a [employer] message.  Caren House RN

## 2025-05-17 ENCOUNTER — HEALTH MAINTENANCE LETTER (OUTPATIENT)
Age: 50
End: 2025-05-17

## (undated) DEVICE — GUIDEWIRE BENTSON FLEX TIP 0.035"X150CM M0066201250

## (undated) DEVICE — TUBING SUCTION MEDI-VAC 1/4"X20' N620A

## (undated) DEVICE — TUBING IRRIG TUR Y TYPE 96" LF 6543-01

## (undated) DEVICE — CATH TRAY FOLEY SURESTEP 16FR DRAIN BAG STATOCK A899916

## (undated) DEVICE — SOL WATER IRRIG 1000ML BOTTLE 2F7114

## (undated) DEVICE — JUMPSUIT CYSTO DISP SD-100

## (undated) DEVICE — GLOVE BIOGEL PI ULTRATOUCH G SZ 7.5 42175

## (undated) DEVICE — KIT ENDO FIRST STEP DISINFECTANT 200ML W/POUCH EP-4

## (undated) DEVICE — SUCTION MANIFOLD NEPTUNE 2 SYS 1 PORT 702-025-000

## (undated) DEVICE — CATH URETERAL OPEN END 5FRX70CM M0064002010

## (undated) DEVICE — GOWN XLG DISP 9545

## (undated) DEVICE — CUSTOM PACK CYSTO PREFERRED SOT5BCYHEA

## (undated) DEVICE — PREP DYNA-HEX 4% CHG SCRUB 4OZ BOTTLE MDS098710

## (undated) DEVICE — GLOVE BIOGEL PI INDICATOR 8.0 LF 41680

## (undated) RX ORDER — ONDANSETRON 2 MG/ML
INJECTION INTRAMUSCULAR; INTRAVENOUS
Status: DISPENSED
Start: 2024-11-29

## (undated) RX ORDER — LIDOCAINE HYDROCHLORIDE 10 MG/ML
INJECTION, SOLUTION EPIDURAL; INFILTRATION; INTRACAUDAL; PERINEURAL
Status: DISPENSED
Start: 2024-11-29

## (undated) RX ORDER — FENTANYL CITRATE 50 UG/ML
INJECTION, SOLUTION INTRAMUSCULAR; INTRAVENOUS
Status: DISPENSED
Start: 2024-11-29

## (undated) RX ORDER — PROPOFOL 10 MG/ML
INJECTION, EMULSION INTRAVENOUS
Status: DISPENSED
Start: 2024-11-29

## (undated) RX ORDER — DEXAMETHASONE SODIUM PHOSPHATE 4 MG/ML
INJECTION, SOLUTION INTRA-ARTICULAR; INTRALESIONAL; INTRAMUSCULAR; INTRAVENOUS; SOFT TISSUE
Status: DISPENSED
Start: 2024-11-29